# Patient Record
Sex: FEMALE | Race: BLACK OR AFRICAN AMERICAN | Employment: FULL TIME | URBAN - METROPOLITAN AREA
[De-identification: names, ages, dates, MRNs, and addresses within clinical notes are randomized per-mention and may not be internally consistent; named-entity substitution may affect disease eponyms.]

---

## 2017-03-21 ENCOUNTER — OFFICE VISIT (OUTPATIENT)
Dept: INTERNAL MEDICINE CLINIC | Age: 52
End: 2017-03-21

## 2017-03-21 VITALS
HEART RATE: 72 BPM | HEIGHT: 65 IN | OXYGEN SATURATION: 98 % | TEMPERATURE: 97.9 F | BODY MASS INDEX: 22.49 KG/M2 | SYSTOLIC BLOOD PRESSURE: 123 MMHG | DIASTOLIC BLOOD PRESSURE: 77 MMHG | RESPIRATION RATE: 21 BRPM | WEIGHT: 135 LBS

## 2017-03-21 DIAGNOSIS — M79.7 FIBROMYALGIA: ICD-10-CM

## 2017-03-21 DIAGNOSIS — Q61.5 MEDULLARY SPONGE KIDNEY: ICD-10-CM

## 2017-03-21 DIAGNOSIS — N20.0 NEPHROLITHIASIS: Primary | ICD-10-CM

## 2017-03-21 RX ORDER — ROSUVASTATIN CALCIUM 10 MG/1
10 TABLET, COATED ORAL
Qty: 30 TAB | Refills: 11 | Status: SHIPPED | OUTPATIENT
Start: 2017-03-21 | End: 2018-05-08 | Stop reason: SDUPTHER

## 2017-03-21 RX ORDER — ROSUVASTATIN CALCIUM 10 MG/1
10 TABLET, COATED ORAL
COMMUNITY
End: 2017-03-21 | Stop reason: SDUPTHER

## 2017-03-21 RX ORDER — DULOXETIN HYDROCHLORIDE 30 MG/1
30 CAPSULE, DELAYED RELEASE ORAL DAILY
Qty: 30 CAP | Refills: 5 | Status: SHIPPED | OUTPATIENT
Start: 2017-03-21 | End: 2018-05-08 | Stop reason: ALTCHOICE

## 2017-03-21 NOTE — MR AVS SNAPSHOT
Visit Information Date & Time Provider Department Dept. Phone Encounter #  
 3/21/2017  3:30 PM Zuleyka Hurley MD [facility]TS MED [facility] PRIMARY CARE - Antoni Hauser [phone] [id number] Follow-up Instructions Return in about 1 year (around 3/21/2018). Follow-up and Disposition History Upcoming Health Maintenance Date Due FOBT Q 1 YEAR AGE 50-75 8/5/2016 BREAST CANCER SCRN MAMMOGRAM 1/7/2018 PAP AKA CERVICAL CYTOLOGY 5/27/2018 DTaP/Tdap/Td series (2 - Td) 5/17/2026 Allergies as of 3/21/2017  Review Complete On: 3/21/2017 By: Zuleyka Hurley MD  
  
 Severity Noted Reaction Type Reactions Penicillins  12/29/2014   Side Effect Hives Current Immunizations  Never Reviewed No immunizations on file. Not reviewed this visit You Were Diagnosed With   
  
 Codes Comments Nephrolithiasis    -  Primary ICD-10-CM: N20.0 ICD-9-CM: 592.0 Medullary sponge kidney     ICD-10-CM: Q61.5 ICD-9-CM: 753.17 Fibromyalgia     ICD-10-CM: M79.7 ICD-9-CM: 729.1 Vitals BP Pulse Temp Resp Height(growth percentile) Weight(growth percentile) 123/77 (BP 1 Location: Right arm, BP Patient Position: Sitting) 72 97.9 °F (36.6 °C) (Oral) 21 5' 5\" (1.651 m) 135 lb (61.2 kg) SpO2 BMI OB Status Smoking Status 98% 22.47 kg/m2 Postmenopausal Never Smoker BMI and BSA Data Body Mass Index Body Surface Area  
 22.47 kg/m 2 1.68 m 2 Preferred Pharmacy Pharmacy Name Phone [facility] [address], [facility] [phone] Your Updated Medication List  
  
   
This list is accurate as of: 3/21/17  5:14 PM.  Always use your most recent med list.  
  
  
  
  
 DULoxetine 30 mg capsule Commonly known as:  CYMBALTA Take 1 Cap by mouth daily. OTHER Rx: 1 pair of compression stockings, medium tightness (need to check) Dx: orthostatic hypotension (458.0) rosuvastatin 10 mg tablet Commonly known as:  CRESTOR Take 1 Tab by mouth nightly. Prescriptions Sent to Pharmacy Refills  
 rosuvastatin (CRESTOR) 10 mg tablet 11 Sig: Take 1 Tab by mouth nightly. Class: Normal  
 Pharmacy: [address] Ph #: [phone] Route: Oral  
 DULoxetine (CYMBALTA) 30 mg capsule 5 Sig: Take 1 Cap by mouth daily. Class: Normal  
 Pharmacy: [address] Ph #: [phone] Route: Oral  
  
We Performed the Following CBC WITH AUTOMATED DIFF [83574 CPT(R)] CULTURE, URINE [id number] CPT(R)] HEMOGLOBIN A1C WITH EAG [60291 CPT(R)] LIPID PANEL [99189 CPT(R)] METABOLIC PANEL, COMPREHENSIVE [84271 CPT(R)] AR COLLECTION VENOUS BLOOD,VENIPUNCTURE [id number] CPT(R)] TSH 3RD GENERATION [59464 CPT(R)] URIC ACID [id number] CPT(R)] URINALYSIS W/ RFLX MICROSCOPIC [67425 CPT(R)] Follow-up Instructions Return in about 1 year (around 3/21/2018). Introducing [facility] & HEALTH SERVICES! Dear Jatinder Moon: 
Thank you for requesting a [employer] account. Our records indicate that you already have an active [employer] account. You can access your account anytime at [email or web address] Did you know that you can access your hospital and ER discharge instructions at any time in [employer]? You can also review all of your test results from your hospital stay or ER visit. Additional Information If you have questions, please visit the Frequently Asked Questions section of the [employer] website at [email or web address] Remember, [employer] is NOT to be used for urgent needs. For medical emergencies, dial 911. Now available from your iPhone and Android! Please provide this summary of care documentation to your next provider. Your primary care clinician is listed as Thad Darnell. If you have any questions after today's visit, please call [phone].

## 2017-03-21 NOTE — PROGRESS NOTES
SPORTS MEDICINE AND PRIMARY CARE  Dyan Brooks MD, [address] [address]  Phone:  [phone]  Fax: [phone]      Chief Complaint   Patient presents with    Physical         SUBECTIVE:    Anibal Andersen is a 46 y.o. female Patient returns today ambulatory, alert and appropriate and has the capacity to give an accurate history. Since we last saw her she was seen by Ashvin Quiros MD on 10/23/16 for hematuria. Her blood count at that time was unremarkable as well as a CMP. A CT of the abdomen and pelvis revealed bilateral nephrolithiasis. [facility] recurrent [facility] disease related to poor hydration and increased salt intake. Patient has a history of dyslipidemia, cigarette abuse, and fibromyalgia and is seen for evaluation. Current Outpatient Prescriptions   Medication Sig Dispense Refill    rosuvastatin (CRESTOR) 10 mg tablet Take 1 Tab by mouth nightly. 30 Tab 11    DULoxetine (CYMBALTA) 30 mg capsule Take 1 Cap by mouth daily. 30 Cap 5    OTHER Rx: 1 pair of compression stockings, medium tightness (need to check)  Dx: orthostatic hypotension (458.0) 1 Units 1     Past Medical History:   Diagnosis Date    BPV (benign positional vertigo)     Breast pain     Cigarette smoker     stopped 1-1-14    Dense breast     Dyslipidemia     Fatigue     Fibromyalgia     GERD (gastroesophageal reflux disease)     HA (headache)     Headache     Hypercholesterolemia     Left forearm pain     Medullary sponge kidney     Myalgia     Nephrolithiasis 10/22/2016    Normal cardiac stress test 12-10-15    STRESS ECHO    Prediabetes     UTI (lower urinary tract infection)      Past Surgical History:   Procedure Laterality Date    HX GYN      HX LITHOTRIPSY       Allergies   Allergen Reactions    Penicillins Hives       REVIEW OF SYSTEMS:   Pain is related to fibromyalgia.          Social History     Social History  Marital status: SINGLE     Spouse name: N/A    Number of children: N/A    Years of education: N/A     Social History Main Topics    Smoking status: Never Smoker    Smokeless tobacco: Never Used    Alcohol use No    Drug use: No    Sexual activity: Not Asked     Other Topics Concern    None     Social History Narrative   r  Family History   Problem Relation Age of Onset    Cancer Father        OBJECTIVE:  Visit Vitals    /77 (BP 1 Location: Right arm, BP Patient Position: Sitting)    Pulse 72    Temp 97.9 °F (36.6 °C) (Oral)    Resp 21    Ht 5' 5\" (1.651 m)    Wt 135 lb (61.2 kg)    SpO2 98%    BMI 22.47 kg/m2     ENT: perrla,  eom intact  NECK: supple. Thyroid normal  CHEST: clear to ascultation and percussion   HEART: regular rate and rhythm  ABD: soft, bowel sounds active  EXTREMITIES: no edema, pulse 1+     No visits with results within 3 Month(s) from this visit. Latest known visit with results is:    Admission on 10/22/2016, Discharged on 10/22/2016   Component Date Value Ref Range Status    WBC 10/22/2016 6.8  3.6 - 11.0 K/uL Final    RBC 10/22/2016 4.20  3.80 - 5.20 M/uL Final    HGB 10/22/2016 12.4  11.5 - 16.0 g/dL Final    HCT 10/22/2016 37.5  35.0 - 47.0 % Final    MCV 10/22/2016 89.3  80.0 - 99.0 FL Final    MCH 10/22/2016 29.5  26.0 - 34.0 PG Final    MCHC 10/22/2016 33.1  30.0 - 36.5 g/dL Final    RDW 10/22/2016 13.1  11.5 - 14.5 % Final    PLATELET 13/94/3714 927  150 - 400 K/uL Final    NEUTROPHILS 10/22/2016 54  32 - 75 % Final    LYMPHOCYTES 10/22/2016 37  12 - 49 % Final    MONOCYTES 10/22/2016 7  5 - 13 % Final    EOSINOPHILS 10/22/2016 2  0 - 7 % Final    BASOPHILS 10/22/2016 0  0 - 1 % Final    ABS. NEUTROPHILS 10/22/2016 3.7  1.8 - 8.0 K/UL Final    ABS. LYMPHOCYTES 10/22/2016 2.6  0.8 - 3.5 K/UL Final    ABS. MONOCYTES 10/22/2016 0.5  0.0 - 1.0 K/UL Final    ABS. EOSINOPHILS 10/22/2016 0.1  0.0 - 0.4 K/UL Final    ABS.  BASOPHILS 10/22/2016 0.0 0.0 - 0.1 K/UL Final    Sodium 10/22/2016 140  136 - 145 mmol/L Final    Potassium 10/22/2016 3.6  3.5 - 5.1 mmol/L Final    Chloride 10/22/2016 104  97 - 108 mmol/L Final    CO2 10/22/2016 30  21 - 32 mmol/L Final    Anion gap 10/22/2016 6  5 - 15 mmol/L Final    Glucose 10/22/2016 77  65 - 100 mg/dL Final    BUN 10/22/2016 14  6 - 20 MG/DL Final    Creatinine 10/22/2016 0.70  0.55 - 1.02 MG/DL Final    BUN/Creatinine ratio 10/22/2016 20  12 - 20   Final    GFR est AA 10/22/2016 >60  >60 ml/min/1.73m2 Final    GFR est non-AA 10/22/2016 >60  >60 ml/min/1.73m2 Final    Comment: Estimated GFR is calculated using the IDMS-traceable Modification of Diet in Renal Disease (MDRD) Study equation, reported for both  Americans (GFRAA) and non- Americans (GFRNA), and normalized to 1.73m2 body surface area. The physician must decide which value applies to the patient. The MDRD study equation should only be used in individuals age 25 or older. It has not been validated for the following: pregnant women, patients with serious comorbid conditions, or on certain medications, or persons with extremes of body size, muscle mass, or nutritional status.  Calcium 10/22/2016 9.3  8.5 - 10.1 MG/DL Final    Bilirubin, total 10/22/2016 0.3  0.2 - 1.0 MG/DL Final    ALT (SGPT) 10/22/2016 19  12 - 78 U/L Final    AST (SGOT) 10/22/2016 17  15 - 37 U/L Final    Alk.  phosphatase 10/22/2016 115  45 - 117 U/L Final    Protein, total 10/22/2016 8.0  6.4 - 8.2 g/dL Final    Albumin 10/22/2016 4.0  3.5 - 5.0 g/dL Final    Globulin 10/22/2016 4.0  2.0 - 4.0 g/dL Final    A-G Ratio 10/22/2016 1.0* 1.1 - 2.2   Final    Color 10/22/2016 RED    Final    Comment: Macroscopic performed on spun urine due to gross blood  or mucus  Color Reference Range: Straw, Yellow or Dark Yellow      Appearance 10/22/2016 BLOODY* CLEAR   Final    Specific gravity 10/22/2016 1.015  1.003 - 1.030   Final    pH (UA) 10/22/2016 7.5 5.0 - 8.0   Final    Protein 10/22/2016 30* NEG mg/dL Final    Glucose 10/22/2016 NEGATIVE   NEG mg/dL Final    Reflex testing of urine samples from children less than two years of age for reducing substances has been discontinued. If the clinical setting warrants, urine reducing substance testing is available upon specific physician order.  Ketone 10/22/2016 NEGATIVE   NEG mg/dL Final    Bilirubin 10/22/2016 NEGATIVE   NEG   Final    Blood 10/22/2016 LARGE* NEG   Final    Urobilinogen 10/22/2016 0.2  0.2 - 1.0 EU/dL Final    Nitrites 10/22/2016 NEGATIVE   NEG   Final    Leukocyte Esterase 10/22/2016 NEGATIVE   NEG   Final    WBC 10/22/2016 0-4  0 - 4 /hpf Final    RBC 10/22/2016 >100* 0 - 5 /hpf Final    Epithelial cells 10/22/2016 MODERATE* FEW /lpf Final    Epithelial cell category consists of squamous cells and /or transitional urothelial cells. Renal tubular cells, if present, are separately identified as such.  Bacteria 10/22/2016 NEGATIVE   NEG /hpf Final    UA:UC IF INDICATED 10/22/2016 CULTURE NOT INDICATED BY UA RESULT  CNI   Final    Lipase 10/22/2016 142  73 - 393 U/L Final          ASSESSMENT:  1. Nephrolithiasis    2. Medullary sponge kidney    3. Fibromyalgia      Neurontin was not effective for the fibromyalgia. She received Percocet for the stone disease and found that very effective. We suggest we will not prescribe narcotics for fibromyalgia and we give her the explanation as to why. Since the Gabapentin was not effective we will give her a trial of Cymbalta, titrate the dose until she is more comfortable. She is out of her Crestor for which we refill today. Blood pressure control is at goal.      Her BMI is improved down to 22.47. We encourage physical activity for 30 minutes five days a week. She will return to see us in six months to a year.           PLAN:  .  Orders Placed This Encounter    CULTURE, URINE    LIPID PANEL    TSH 3RD GENERATION    URIC ACID  CBC WITH AUTOMATED DIFF    METABOLIC PANEL, COMPREHENSIVE    HEMOGLOBIN A1C WITH EAG    URINALYSIS W/ RFLX MICROSCOPIC    DISCONTD: rosuvastatin (CRESTOR) 10 mg tablet    rosuvastatin (CRESTOR) 10 mg tablet    DULoxetine (CYMBALTA) 30 mg capsule       Follow-up Disposition:  Return in about 1 year (around 3/21/2018). ATTENTION:   This medical record was transcribed using an electronic medical records system. Although proofread, it may and can contain electronic and spelling errors. Other human spelling and other errors may be present. Corrections may be executed at a later time. Please feel free to contact us for any clarifications as needed.

## 2017-03-21 NOTE — PROGRESS NOTES
.1. Have you been to the ER, urgent care clinic since your last visit? Hospitalized since your last visit? No    2. Have you seen or consulted any other health care providers outside of the [address] [place] [address] since your last visit? Include any pap smears or colon screening.  No

## 2017-03-22 LAB
ALBUMIN SERPL-MCNC: 4.2 G/DL (ref 3.5–5.5)
ALBUMIN/GLOB SERPL: 1.6 {RATIO} (ref 1.2–2.2)
ALP SERPL-CCNC: 109 IU/L (ref 39–117)
ALT SERPL-CCNC: 10 IU/L (ref 0–32)
APPEARANCE UR: CLEAR
AST SERPL-CCNC: 15 IU/L (ref 0–40)
BACTERIA #/AREA URNS HPF: ABNORMAL /[HPF]
BASOPHILS # BLD AUTO: 0 X10E3/UL (ref 0–0.2)
BASOPHILS NFR BLD AUTO: 0 %
BILIRUB SERPL-MCNC: 0.2 MG/DL (ref 0–1.2)
BILIRUB UR QL STRIP: NEGATIVE
BUN SERPL-MCNC: 15 MG/DL (ref 6–24)
BUN/CREAT SERPL: 26 (ref 9–23)
CALCIUM SERPL-MCNC: 9.4 MG/DL (ref 8.7–10.2)
CASTS URNS QL MICRO: ABNORMAL /LPF
CHLORIDE SERPL-SCNC: 99 MMOL/L (ref 96–106)
CHOLEST SERPL-MCNC: 246 MG/DL (ref 100–199)
CO2 SERPL-SCNC: 25 MMOL/L (ref 18–29)
COLOR UR: YELLOW
CREAT SERPL-MCNC: 0.58 MG/DL (ref 0.57–1)
EOSINOPHIL # BLD AUTO: 0.1 X10E3/UL (ref 0–0.4)
EOSINOPHIL NFR BLD AUTO: 2 %
EPI CELLS #/AREA URNS HPF: ABNORMAL /HPF
ERYTHROCYTE [DISTWIDTH] IN BLOOD BY AUTOMATED COUNT: 13.8 % (ref 12.3–15.4)
EST. AVERAGE GLUCOSE BLD GHB EST-MCNC: 114 MG/DL
GLOBULIN SER CALC-MCNC: 2.6 G/DL (ref 1.5–4.5)
GLUCOSE SERPL-MCNC: 82 MG/DL (ref 65–99)
GLUCOSE UR QL: NEGATIVE
HBA1C MFR BLD: 5.6 % (ref 4.8–5.6)
HCT VFR BLD AUTO: 35.1 % (ref 34–46.6)
HDLC SERPL-MCNC: 75 MG/DL
HGB BLD-MCNC: 11.3 G/DL (ref 11.1–15.9)
HGB UR QL STRIP: ABNORMAL
IMM GRANULOCYTES # BLD: 0 X10E3/UL (ref 0–0.1)
IMM GRANULOCYTES NFR BLD: 0 %
KETONES UR QL STRIP: NEGATIVE
LDLC SERPL CALC-MCNC: 155 MG/DL (ref 0–99)
LEUKOCYTE ESTERASE UR QL STRIP: ABNORMAL
LYMPHOCYTES # BLD AUTO: 3.1 X10E3/UL (ref 0.7–3.1)
LYMPHOCYTES NFR BLD AUTO: 41 %
MCH RBC QN AUTO: 29.3 PG (ref 26.6–33)
MCHC RBC AUTO-ENTMCNC: 32.2 G/DL (ref 31.5–35.7)
MCV RBC AUTO: 91 FL (ref 79–97)
MICRO URNS: ABNORMAL
MONOCYTES # BLD AUTO: 0.6 X10E3/UL (ref 0.1–0.9)
MONOCYTES NFR BLD AUTO: 8 %
MUCOUS THREADS URNS QL MICRO: PRESENT
NEUTROPHILS # BLD AUTO: 3.7 X10E3/UL (ref 1.4–7)
NEUTROPHILS NFR BLD AUTO: 49 %
NITRITE UR QL STRIP: NEGATIVE
PH UR STRIP: 6.5 [PH] (ref 5–7.5)
PLATELET # BLD AUTO: 226 X10E3/UL (ref 150–379)
POTASSIUM SERPL-SCNC: 4 MMOL/L (ref 3.5–5.2)
PROT SERPL-MCNC: 6.8 G/DL (ref 6–8.5)
PROT UR QL STRIP: ABNORMAL
RBC # BLD AUTO: 3.86 X10E6/UL (ref 3.77–5.28)
RBC #/AREA URNS HPF: ABNORMAL /HPF
SODIUM SERPL-SCNC: 140 MMOL/L (ref 134–144)
SP GR UR: 1.02 (ref 1–1.03)
TRIGL SERPL-MCNC: 79 MG/DL (ref 0–149)
TSH SERPL DL<=0.005 MIU/L-ACNC: 1.28 UIU/ML (ref 0.45–4.5)
URATE SERPL-MCNC: 2.9 MG/DL (ref 2.5–7.1)
UROBILINOGEN UR STRIP-MCNC: 1 MG/DL (ref 0.2–1)
VLDLC SERPL CALC-MCNC: 16 MG/DL (ref 5–40)
WBC # BLD AUTO: 7.5 X10E3/UL (ref 3.4–10.8)
WBC #/AREA URNS HPF: ABNORMAL /HPF

## 2017-03-22 RX ORDER — NITROFURANTOIN 25; 75 MG/1; MG/1
100 CAPSULE ORAL 2 TIMES DAILY
Qty: 10 CAP | Refills: 0 | Status: SHIPPED | OUTPATIENT
Start: 2017-03-22 | End: 2017-03-27

## 2017-03-23 LAB — BACTERIA UR CULT: NORMAL

## 2017-03-29 ENCOUNTER — HOSPITAL ENCOUNTER (OUTPATIENT)
Dept: MAMMOGRAPHY | Age: 52
Discharge: HOME | End: 2017-03-29
Attending: INTERNAL MEDICINE
Payer: COMMERCIAL

## 2017-03-29 DIAGNOSIS — Z12.31 VISIT FOR SCREENING MAMMOGRAM: ICD-10-CM

## 2017-03-29 PROCEDURE — 77067 SCR MAMMO BI INCL CAD: CPT

## 2017-06-13 ENCOUNTER — OFFICE VISIT (OUTPATIENT)
Dept: INTERNAL MEDICINE CLINIC | Age: 52
End: 2017-06-13

## 2017-06-13 VITALS
HEIGHT: 65 IN | RESPIRATION RATE: 18 BRPM | HEART RATE: 73 BPM | WEIGHT: 134.8 LBS | BODY MASS INDEX: 22.46 KG/M2 | OXYGEN SATURATION: 100 % | SYSTOLIC BLOOD PRESSURE: 113 MMHG | TEMPERATURE: 98.8 F | DIASTOLIC BLOOD PRESSURE: 63 MMHG

## 2017-06-13 DIAGNOSIS — R73.03 PREDIABETES: ICD-10-CM

## 2017-06-13 DIAGNOSIS — M79.7 FIBROMYALGIA: ICD-10-CM

## 2017-06-13 DIAGNOSIS — M54.16 LUMBAR RADICULOPATHY, ACUTE: Primary | ICD-10-CM

## 2017-06-13 DIAGNOSIS — E78.5 DYSLIPIDEMIA: ICD-10-CM

## 2017-06-13 RX ORDER — PREDNISONE 20 MG/1
60 TABLET ORAL
Qty: 21 TAB | Refills: 0 | Status: SHIPPED | OUTPATIENT
Start: 2017-06-13 | End: 2018-05-08 | Stop reason: ALTCHOICE

## 2017-06-13 NOTE — PROGRESS NOTES
1. Have you been to the ER, urgent care clinic since your last visit? Hospitalized since your last visit? Yes Where: [facility]    2. Have you seen or consulted any other health care providers outside of the [address] [place] [address] since your last visit? Include any pap smears or colon screening.  Yes Reason for visit: right leg pain

## 2017-06-13 NOTE — MR AVS SNAPSHOT
Visit Information Date & Time Provider Department Dept. Phone Encounter #  
 6/13/2017  4:15 PM MD [facility] - Bautista Robert [phone] [id number] Follow-up Instructions Return in about 4 weeks (around 7/11/2017). Follow-up and Disposition History Your Appointments 7/13/2017  4:00 PM  
Any with MD [facility] - Bautista Robert ([address]) Appt Note: 1 month f/u  
 [address] [place] [address] [place] [address]  
  
   
 [address] [facility] [address] Upcoming Health Maintenance Date Due FOBT Q 1 YEAR AGE 50-75 8/5/2016 INFLUENZA AGE 9 TO ADULT 8/1/2017 PAP AKA CERVICAL CYTOLOGY 5/27/2018 BREAST CANCER SCRN MAMMOGRAM 3/29/2019 DTaP/Tdap/Td series (2 - Td) 5/17/2026 Allergies as of 6/13/2017  Review Complete On: 6/13/2017 By: Lucho Coates MD  
  
 Severity Noted Reaction Type Reactions Penicillins  12/29/2014   Side Effect Hives Current Immunizations  Never Reviewed No immunizations on file. Not reviewed this visit You Were Diagnosed With   
  
 Codes Comments Lumbar radiculopathy, acute    -  Primary ICD-10-CM: M54.16 
ICD-9-CM: 724.4 Fibromyalgia     ICD-10-CM: M79.7 ICD-9-CM: 729.1 Dyslipidemia     ICD-10-CM: E78.5 ICD-9-CM: 272.4 Prediabetes     ICD-10-CM: R73.03 
ICD-9-CM: 790.29 Vitals BP Pulse Temp Resp Height(growth percentile) Weight(growth percentile) 113/63 (BP 1 Location: Right arm, BP Patient Position: Sitting) 73 98.8 °F (37.1 °C) (Oral) 18 5' 5\" (1.651 m) 134 lb 12.8 oz (61.1 kg) SpO2 BMI OB Status Smoking Status 100% 22.43 kg/m2 Postmenopausal Never Smoker BMI and BSA Data Body Mass Index Body Surface Area  
 22.43 kg/m 2 1.67 m 2 Preferred Pharmacy Pharmacy Name Phone [facility] [address] [phone] Your Updated Medication List  
  
   
This list is accurate as of: 6/13/17  5:36 PM.  Always use your most recent med list.  
  
  
  
  
 DULoxetine 30 mg capsule Commonly known as:  CYMBALTA Take 1 Cap by mouth daily. OTHER Rx: 1 pair of compression stockings, medium tightness (need to check) Dx: orthostatic hypotension (458.0)  
  
 predniSONE 20 mg tablet Commonly known as:  Letty Bray Take 3 Tabs by mouth daily (with breakfast). rosuvastatin 10 mg tablet Commonly known as:  CRESTOR Take 1 Tab by mouth nightly. Prescriptions Sent to Pharmacy Refills  
 predniSONE (DELTASONE) 20 mg tablet 0 Sig: Take 3 Tabs by mouth daily (with breakfast). Class: Normal  
 Pharmacy: [facility] [address] #: [phone] Route: Oral  
  
We Performed the Following OCCULT BLOOD, IMMUNOASSAY (FIT) [id number] CPT(R)] REFERRAL TO PHYSICAL THERAPY [CAL18 Custom] Follow-up Instructions Return in about 4 weeks (around 7/11/2017). To-Do List   
 06/13/2017 Neurology:  EMG TWO EXTREMITIES LOWER   
  
 06/13/2017 Imaging:  XR SPINE LUMB 2 OR 3 V Referral Information Referral ID Referred By Referred To  
  
 [id number] Amber BRIONES Not Available Visits Status Start Date End Date 1 New Request 6/13/17 6/13/18 If your referral has a status of pending review or denied, additional information will be sent to support the outcome of this decision. Introducing [facility] & [facility]! Dear Marli Fernandez: 
Thank you for requesting a [employer] account. Our records indicate that you already have an active [employer] account. You can access your account anytime at [email or web address] Did you know that you can access your hospital and ER discharge instructions at any time in [employer]? You can also review all of your test results from your hospital stay or ER visit. Additional Information If you have questions, please visit the Frequently Asked Questions section of the [employer] website at [email or web address] Remember, [employer] is NOT to be used for urgent needs. For medical emergencies, dial 911. Now available from your iPhone and Android! Please provide this summary of care documentation to your next provider. Your primary care clinician is listed as Francy Machado. If you have any questions after today's visit, please call [phone].

## 2017-06-13 NOTE — PROGRESS NOTES
SPORTS MEDICINE AND PRIMARY CARE  Liliana Gonsalez MD, [address] [address]  Phone:  [phone]  Fax: [phone]       Chief Complaint   Patient presents with    Leg Pain   . SUBJECTIVE:    Bruce Elizabeth is a 45 y.o. female Patient returns today ambulatory, alert and appropriate and has the capacity to give an accurate history. She has a known history of fibromyalgia, GERD, dyslipidemia,nephrolithiasis and prediabetes. Since we last saw her she has been having right leg discomfort. It starts in the hip area and radiates all the way down the leg and into the calf. She was seen on [facility] at [facility] where they did a doppler exam which was negative. They gave her something for pain in the emergency room and she was released. Patient comes in today still having pain in the right leg. She missed two days of work because of the severity of the pain. Walking seems to aggravate the discomfort. Patient denies other new complaints. There is no history of trauma. Current Outpatient Prescriptions   Medication Sig Dispense Refill    predniSONE (DELTASONE) 20 mg tablet Take 3 Tabs by mouth daily (with breakfast). 21 Tab 0    rosuvastatin (CRESTOR) 10 mg tablet Take 1 Tab by mouth nightly. 30 Tab 11    DULoxetine (CYMBALTA) 30 mg capsule Take 1 Cap by mouth daily.  30 Cap 5    OTHER Rx: 1 pair of compression stockings, medium tightness (need to check)  Dx: orthostatic hypotension (458.0) 1 Units 1     Past Medical History:   Diagnosis Date    BPV (benign positional vertigo)     Breast pain     Cigarette smoker     stopped 1-1-14    Dense breast     Dyslipidemia     Fatigue     Fibromyalgia     GERD (gastroesophageal reflux disease)     HA (headache)     Headache     Hypercholesterolemia     Left forearm pain     Lumbar radiculopathy, acute 06/13/2017    Medullary sponge kidney     Myalgia     Nephrolithiasis 10/22/2016    Normal cardiac stress test 12-10-15    STRESS ECHO    Prediabetes     UTI (lower urinary tract infection)      Past Surgical History:   Procedure Laterality Date    HX GYN      HX LITHOTRIPSY       Allergies   Allergen Reactions    Penicillins Hives         REVIEW OF SYSTEMS:  General: negative for - chills or fever  ENT: negative for - headaches, nasal congestion or tinnitus  Respiratory: negative for - cough, hemoptysis, shortness of breath or wheezing  Cardiovascular : negative for - chest pain, edema, palpitations or shortness of breath  Gastrointestinal: negative for - abdominal pain, blood in stools, heartburn or nausea/vomiting  Genito-Urinary: no dysuria, trouble voiding, or hematuria  Musculoskeletal: negative for - gait disturbance, joint pain, joint stiffness or joint swelling  Neurological: no TIA or stroke symptoms  Hematologic: no bruises, no bleeding, no swollen glands  Integument: no lumps, mole changes, nail changes or rash  Endocrine: no malaise/lethargy or unexpected weight changes      Social History     Social History    Marital status: SINGLE     Spouse name: N/A    Number of children: N/A    Years of education: N/A     Social History Main Topics    Smoking status: Never Smoker    Smokeless tobacco: Never Used    Alcohol use No    Drug use: No    Sexual activity: Not Asked     Other Topics Concern    None     Social History Narrative     Family History   Problem Relation Age of Onset    Cancer Father        OBJECTIVE:    Visit Vitals    /63 (BP 1 Location: Right arm, BP Patient Position: Sitting)    Pulse 73    Temp 98.8 °F (37.1 °C) (Oral)    Resp 18    Ht 5' 5\" (1.651 m)    Wt 134 lb 12.8 oz (61.1 kg)    SpO2 100%    BMI 22.43 kg/m2     CONSTITUTIONAL: well , well nourished, appears age appropriate  EYES: perrla, eom intact  ENMT:moist mucous membranes, pharynx clear  NECK: supple.  Thyroid normal  RESPIRATORY: Chest: clear bilaterally   CARDIOVASCULAR: Heart: regular rate and rhythm  GASTROINTESTINAL: Abdomen: soft, bowel sounds active  HEMATOLOGIC: no pathological lymph nodes palpated  MUSCULOSKELETAL: Extremities: no edema, pulse 1+   INTEGUMENT: No unusual rashes or suspicious skin lesions noted. Nails appear normal.  NEUROLOGIC: non-focal exam   MENTAL STATUS: alert and oriented, appropriate affect           ASSESSMENT:  1. Lumbar radiculopathy, acute    2. Fibromyalgia    3. Dyslipidemia    4. Prediabetes      Straight leg raise is not particularly positive but her sister suggested she has a lumbar radiculopathy. We will give her a seven day course of steroids to see if that alleviates the discomfort. We will also ask for an x-ray of the lumbar spine and EMG and nerve conduction studies for the lower extremities. If the EMG and nerve conduction studies are negative then further evaluation probably would not be beneficial.  To try to relieve some of the discomfort in addition we will have her see physical therapy. Blood pressure control is excellent. Her BMI remains in the ideal body weight range. She will return to see us then in about two or three weeks after the studies are completed. PLAN:  .  Orders Placed This Encounter    XR SPINE LUMB 2 OR 3 V    OCCULT BLOOD, IMMUNOASSAY (FIT)    REFERRAL TO PHYSICAL THERAPY    predniSONE (DELTASONE) 20 mg tablet       Follow-up Disposition:  Return in about 4 weeks (around 7/11/2017). ATTENTION:   This medical record was transcribed using an electronic medical records system. Although proofread, it may and can contain electronic and spelling errors. Other human spelling and other errors may be present. Corrections may be executed at a later time. Please feel free to contact us for any clarifications as needed.

## 2017-11-27 RX ORDER — CIPROFLOXACIN 500 MG/1
500 TABLET ORAL 2 TIMES DAILY
Qty: 14 TAB | Refills: 0 | OUTPATIENT
Start: 2017-11-27 | End: 2017-12-04

## 2017-11-27 RX ORDER — NITROFURANTOIN 25; 75 MG/1; MG/1
100 CAPSULE ORAL 2 TIMES DAILY
Qty: 10 CAP | Refills: 0 | Status: SHIPPED | OUTPATIENT
Start: 2017-11-27 | End: 2017-12-02

## 2018-05-08 ENCOUNTER — OFFICE VISIT (OUTPATIENT)
Dept: INTERNAL MEDICINE CLINIC | Age: 53
End: 2018-05-08

## 2018-05-08 VITALS
HEIGHT: 65 IN | DIASTOLIC BLOOD PRESSURE: 87 MMHG | RESPIRATION RATE: 16 BRPM | WEIGHT: 132.2 LBS | TEMPERATURE: 97.8 F | HEART RATE: 65 BPM | OXYGEN SATURATION: 99 % | SYSTOLIC BLOOD PRESSURE: 135 MMHG | BODY MASS INDEX: 22.02 KG/M2

## 2018-05-08 DIAGNOSIS — M79.7 FIBROMYALGIA: ICD-10-CM

## 2018-05-08 DIAGNOSIS — Q61.5 MEDULLARY SPONGE KIDNEY: Primary | ICD-10-CM

## 2018-05-08 DIAGNOSIS — E78.5 DYSLIPIDEMIA: ICD-10-CM

## 2018-05-08 DIAGNOSIS — Z12.11 SCREEN FOR COLON CANCER: ICD-10-CM

## 2018-05-08 DIAGNOSIS — N20.0 NEPHROLITHIASIS: ICD-10-CM

## 2018-05-08 RX ORDER — ROSUVASTATIN CALCIUM 10 MG/1
10 TABLET, COATED ORAL
Qty: 30 TAB | Refills: 11 | Status: SHIPPED | OUTPATIENT
Start: 2018-05-08 | End: 2019-02-05 | Stop reason: SDUPTHER

## 2018-05-08 NOTE — PROGRESS NOTES
1. Have you been to the ER, urgent care clinic since your last visit? Hospitalized since your last visit? Yes When: 4-1-18 Where: [facility] Reason for visit: kidney stones    2. Have you seen or consulted any other health care providers outside of the [address] [place] [address] since your last visit? Include any pap smears or colon screening.  Yes When: 4-1-18 [facility] Reason for visit: kidney stones

## 2018-05-08 NOTE — MR AVS SNAPSHOT
[place] [address] 
[phone] Patient: Margareth Murillo MRN: [id number] Visit Information Date & Time Provider Department Dept. Phone Encounter #  
 2018  4:00 PM Jailene Hicks MD [facility] - [facility] [phone] [id number] Follow-up Instructions Return in about 3 months (around 2018). Follow-up and Disposition History Upcoming Health Maintenance Date Due FOBT Q 1 YEAR AGE 50-75 2016 PAP AKA CERVICAL CYTOLOGY 2018 Influenza Age 5 to Adult 2018 BREAST CANCER SCRN MAMMOGRAM 3/29/2019 DTaP/Tdap/Td series (2 - Td) 2026 Allergies as of 2018  Review Complete On: 2018 By: Jailene Hicks MD  
  
 Severity Noted Reaction Type Reactions Penicillins  2014   Side Effect Hives Current Immunizations  Never Reviewed No immunizations on file. Not reviewed this visit You Were Diagnosed With   
  
 Codes Comments Medullary sponge kidney    -  Primary ICD-10-CM: Q61.5 ICD-9-CM: 753.17 Nephrolithiasis     ICD-10-CM: N20.0 ICD-9-CM: 592.0 Dyslipidemia     ICD-10-CM: E78.5 ICD-9-CM: 272.4 Fibromyalgia     ICD-10-CM: M79.7 ICD-9-CM: 729.1 Screen for colon cancer     ICD-10-CM: Z12.11 ICD-9-CM: V76.51 Vitals BP Pulse Temp Resp Height(growth percentile) Weight(growth percentile) 135/87 65 97.8 °F (36.6 °C) (Oral) 16 5' 5\" (1.651 m) 132 lb 3.2 oz (60 kg) SpO2 BMI OB Status Smoking Status 99% 22 kg/m2 Postmenopausal Never Smoker BMI and BSA Data Body Mass Index Body Surface Area  
 22 kg/m 2 1.66 m 2 Preferred Pharmacy Pharmacy Name Phone [facility] [address] [place] [address], [address] [facility] [phone] Your Updated Medication List  
  
   
 This list is accurate as of 5/8/18  5:36 PM.  Always use your most recent med list.  
  
  
  
  
 rosuvastatin 10 mg tablet Commonly known as:  CRESTOR Take 1 Tab by mouth nightly. Prescriptions Sent to Pharmacy Refills  
 rosuvastatin (CRESTOR) 10 mg tablet 11 Sig: Take 1 Tab by mouth nightly. Class: Normal  
 Pharmacy: [facility]  [address] #: [phone] Route: Oral  
  
We Performed the Following CBC WITH AUTOMATED DIFF [38463 CPT(R)] COLLECTION VENOUS BLOOD,VENIPUNCTURE X1419842 CPT(R)] HEMOGLOBIN A1C WITH EAG [95840 CPT(R)] LIPID PANEL [29461 CPT(R)] METABOLIC PANEL, COMPREHENSIVE [38513 CPT(R)] REFERRAL TO GASTROENTEROLOGY [DXT73 Custom] Comments:  
 Please evaluate patient for colon. TSH 3RD GENERATION [47727 CPT(R)] Follow-up Instructions Return in about 3 months (around 8/8/2018). To-Do List   
 05/08/2018 Imaging:  JOÃO MAMMO BI SCREENING INCL CAD Referral Information Referral ID Referred By Referred To  
  
 [id number] Lesley Méndez MD   
   [address] [facility] [address] [place] [address], [address] [facility] Phone: [phone] Fax: [phone] Visits Status Start Date End Date 1 New Request 5/8/18 5/8/19 If your referral has a status of pending review or denied, additional information will be sent to support the outcome of this decision. Introducing [facility]! Dear Rebeka Hand: 
Thank you for requesting a [employer] account. Our records indicate that you already have an active [employer] account. You can access your account anytime at [email or web address] Did you know that you can access your hospital and ER discharge instructions at any time in [employer]? You can also review all of your test results from your hospital stay or ER visit. Additional Information If you have questions, please visit the Frequently Asked Questions section of the [employer] website at [email or web address] Remember, [employer] is NOT to be used for urgent needs. For medical emergencies, dial 911. Now available from your iPhone and Android! Please provide this summary of care documentation to your next provider. Your primary care clinician is listed as Elisa Hurst. If you have any questions after today's visit, please call [phone].

## 2018-05-08 NOTE — PROGRESS NOTES
SPOR[facility]ICINE [facility] PR[facility]ARE  Oskar Gutierres MD, [address] [address]  Phone:  [phone]  Fax: [phone]       Chief Complaint   Patient presents with    Cholesterol Problem   . SUBJECTIVE:    Kandice Craig is a 52 y.o. female Patient returns today with known history of medullary sponge kidney, nephrolithiasis, dense breasts, fibromyalgia, GERD, dyslipidemia, and is seen for evaluation. She saw urologist earlier at a visit to the ER. They recommended lithotripsy to crush the stone. This is being arranged currently by the urologist.  Other specific complaints denied and patient is seen for evaluation. Current Outpatient Prescriptions   Medication Sig Dispense Refill    rosuvastatin (CRESTOR) 10 mg tablet Take 1 Tab by mouth nightly.  27 Tab 11     Past Medical History:   Diagnosis Date    BPV (benign positional vertigo)     Breast pain     Cigarette smoker     stopped 1-1-14    Dense breast     Dyslipidemia     Fatigue     Fibromyalgia     GERD (gastroesophageal reflux disease)     HA (headache)     Headache     Hypercholesterolemia     Left forearm pain     Lumbar radiculopathy, acute 06/13/2017    Medullary sponge kidney     Myalgia     Nephrolithiasis 10/22/2016    Normal cardiac stress test 12-10-15    STRESS ECHO    Prediabetes     UTI (lower urinary tract infection)      Past Surgical History:   Procedure Laterality Date    HX GYN      HX LITHOTRIPSY       Allergies   Allergen Reactions    Penicillins Hives         REVIEW OF SYSTEMS:  General: negative for - chills or fever  ENT: negative for - headaches, nasal congestion or tinnitus  Respiratory: negative for - cough, hemoptysis, shortness of breath or wheezing  Cardiovascular : negative for - chest pain, edema, palpitations or shortness of breath  Gastrointestinal: negative for - abdominal pain, blood in stools, heartburn or nausea/vomiting  Genito-Urinary: no dysuria, trouble voiding, or hematuria  Musculoskeletal: negative for - gait disturbance, joint pain, joint stiffness or joint swelling  Neurological: no TIA or stroke symptoms  Hematologic: no bruises, no bleeding, no swollen glands  Integument: no lumps, mole changes, nail changes or rash  Endocrine: no malaise/lethargy or unexpected weight changes      Social History     Social History    Marital status: SINGLE     Spouse name: N/A    Number of children: N/A    Years of education: N/A     Social History Main Topics    Smoking status: Never Smoker    Smokeless tobacco: Never Used    Alcohol use No    Drug use: No    Sexual activity: Not Asked     Other Topics Concern    None     Social History Narrative     Family History   Problem Relation Age of Onset    Cancer Father        OBJECTIVE:    Visit Vitals    /87    Pulse 65    Temp 97.8 °F (36.6 °C) (Oral)    Resp 16    Ht 5' 5\" (1.651 m)    Wt 132 lb 3.2 oz (60 kg)    SpO2 99%    BMI 22 kg/m2     CONSTITUTIONAL: well , well nourished, appears age appropriate  EYES: perrla, eom intact  ENMT:moist mucous membranes, pharynx clear  NECK: supple. Thyroid normal  RESPIRATORY: Chest: clear bilaterally   CARDIOVASCULAR: Heart: regular rate and rhythm  GASTROINTESTINAL: Abdomen: soft, bowel sounds active  HEMATOLOGIC: no pathological lymph nodes palpated  MUSCULOSKELETAL: Extremities: no edema, pulse 1+   INTEGUMENT: No unusual rashes or suspicious skin lesions noted. Nails appear normal.  NEUROLOGIC: non-focal exam   MENTAL STATUS: alert and oriented, appropriate affect           ASSESSMENT:  1. Medullary sponge kidney    2. Nephrolithiasis    3. Dyslipidemia    4. Fibromyalgia    5. Screen for colon cancer      BP is a little higher than we'd like to see it and when we check it it gets to the 140s. She'll check it outside the office and if consistently greater than 130/80 we'll place her on a step 1 antihypertensive. She is agreeable to the plan.     She's not been taking her cholesterol medication. She didn't think insurance would pay for it. We sent it over to her pharmacist without any suggestion by the computer that it will not be paid for by the insurance company. Fibromyalgia is currently quiescent. She remains at her ideal body weight. She'll return to the office in about three months, primarily so we can look at her blood pressure. PLAN:  .  Orders Placed This Encounter    JOÃO MAMMO BI SCREENING INCL CAD    CBC WITH AUTOMATED DIFF    METABOLIC PANEL, COMPREHENSIVE    LIPID PANEL    TSH 3RD GENERATION    HEMOGLOBIN A1C WITH EAG    REFERRAL TO GASTROENTEROLOGY    rosuvastatin (CRESTOR) 10 mg tablet       Follow-up Disposition:  Return in about 3 months (around 8/8/2018). ATTENTION:   This medical record was transcribed using an electronic medical records system. Although proofread, it may and can contain electronic and spelling errors. Other human spelling and other errors may be present. Corrections may be executed at a later time. Please feel free to contact us for any clarifications as needed.

## 2018-05-09 LAB
ALBUMIN SERPL-MCNC: 4.3 G/DL (ref 3.5–5.5)
ALBUMIN/GLOB SERPL: 1.7 {RATIO} (ref 1.2–2.2)
ALP SERPL-CCNC: 124 IU/L (ref 39–117)
ALT SERPL-CCNC: 11 IU/L (ref 0–32)
AST SERPL-CCNC: 14 IU/L (ref 0–40)
BASOPHILS # BLD AUTO: 0 X10E3/UL (ref 0–0.2)
BASOPHILS NFR BLD AUTO: 0 %
BILIRUB SERPL-MCNC: <0.2 MG/DL (ref 0–1.2)
BUN SERPL-MCNC: 18 MG/DL (ref 6–24)
BUN/CREAT SERPL: 29 (ref 9–23)
CALCIUM SERPL-MCNC: 9.9 MG/DL (ref 8.7–10.2)
CHLORIDE SERPL-SCNC: 101 MMOL/L (ref 96–106)
CHOLEST SERPL-MCNC: 251 MG/DL (ref 100–199)
CO2 SERPL-SCNC: 29 MMOL/L (ref 18–29)
CREAT SERPL-MCNC: 0.63 MG/DL (ref 0.57–1)
EOSINOPHIL # BLD AUTO: 0.2 X10E3/UL (ref 0–0.4)
EOSINOPHIL NFR BLD AUTO: 2 %
ERYTHROCYTE [DISTWIDTH] IN BLOOD BY AUTOMATED COUNT: 14.1 % (ref 12.3–15.4)
EST. AVERAGE GLUCOSE BLD GHB EST-MCNC: 105 MG/DL
GFR SERPLBLD CREATININE-BSD FMLA CKD-EPI: 101 ML/MIN/1.73
GFR SERPLBLD CREATININE-BSD FMLA CKD-EPI: 117 ML/MIN/1.73
GLOBULIN SER CALC-MCNC: 2.6 G/DL (ref 1.5–4.5)
GLUCOSE SERPL-MCNC: 84 MG/DL (ref 65–99)
HBA1C MFR BLD: 5.3 % (ref 4.8–5.6)
HCT VFR BLD AUTO: 36.7 % (ref 34–46.6)
HDLC SERPL-MCNC: 73 MG/DL
HGB BLD-MCNC: 11.9 G/DL (ref 11.1–15.9)
IMM GRANULOCYTES # BLD: 0 X10E3/UL (ref 0–0.1)
IMM GRANULOCYTES NFR BLD: 0 %
LDLC SERPL CALC-MCNC: 159 MG/DL (ref 0–99)
LYMPHOCYTES # BLD AUTO: 3.2 X10E3/UL (ref 0.7–3.1)
LYMPHOCYTES NFR BLD AUTO: 41 %
MCH RBC QN AUTO: 28.7 PG (ref 26.6–33)
MCHC RBC AUTO-ENTMCNC: 32.4 G/DL (ref 31.5–35.7)
MCV RBC AUTO: 89 FL (ref 79–97)
MONOCYTES # BLD AUTO: 0.6 X10E3/UL (ref 0.1–0.9)
MONOCYTES NFR BLD AUTO: 8 %
NEUTROPHILS # BLD AUTO: 3.7 X10E3/UL (ref 1.4–7)
NEUTROPHILS NFR BLD AUTO: 49 %
PLATELET # BLD AUTO: 235 X10E3/UL (ref 150–379)
POTASSIUM SERPL-SCNC: 4.5 MMOL/L (ref 3.5–5.2)
PROT SERPL-MCNC: 6.9 G/DL (ref 6–8.5)
RBC # BLD AUTO: 4.14 X10E6/UL (ref 3.77–5.28)
SODIUM SERPL-SCNC: 141 MMOL/L (ref 134–144)
TRIGL SERPL-MCNC: 95 MG/DL (ref 0–149)
TSH SERPL DL<=0.005 MIU/L-ACNC: 2.42 UIU/ML (ref 0.45–4.5)
VLDLC SERPL CALC-MCNC: 19 MG/DL (ref 5–40)
WBC # BLD AUTO: 7.8 X10E3/UL (ref 3.4–10.8)

## 2018-06-20 ENCOUNTER — DOCUMENTATION ONLY (OUTPATIENT)
Dept: INTERNAL MEDICINE CLINIC | Age: 53
End: 2018-06-20

## 2018-06-20 NOTE — PROGRESS NOTES
Called patient in regard to scheduling an appointment for her to recieve her mammogram. I was unable to reach the patient and I was unable to leave a message due to the fact that she does not have a voice mail on her phone.  A letter has been mail out to the patient in regard to this referral.

## 2018-09-05 ENCOUNTER — TELEPHONE (OUTPATIENT)
Dept: INTERNAL MEDICINE CLINIC | Age: 53
End: 2018-09-05

## 2018-09-05 NOTE — TELEPHONE ENCOUNTER
PATIENT CALLED OFFICE STATING BURNING WHEN URINATE. PER DR. DICK WE CALL IN MACROBID 100MG 1 BID X 7 DAYS.

## 2019-01-17 ENCOUNTER — HOSPITAL ENCOUNTER (OUTPATIENT)
Dept: MAMMOGRAPHY | Age: 54
Discharge: HOME | End: 2019-01-17
Attending: INTERNAL MEDICINE
Payer: COMMERCIAL

## 2019-01-17 DIAGNOSIS — Z12.39 SCREENING BREAST EXAMINATION: ICD-10-CM

## 2019-01-17 PROCEDURE — 77067 SCR MAMMO BI INCL CAD: CPT

## 2019-02-05 ENCOUNTER — OFFICE VISIT (OUTPATIENT)
Dept: INTERNAL MEDICINE CLINIC | Age: 54
End: 2019-02-05

## 2019-02-05 VITALS
DIASTOLIC BLOOD PRESSURE: 82 MMHG | SYSTOLIC BLOOD PRESSURE: 133 MMHG | OXYGEN SATURATION: 99 % | RESPIRATION RATE: 18 BRPM | BODY MASS INDEX: 22.33 KG/M2 | WEIGHT: 134 LBS | HEIGHT: 65 IN | HEART RATE: 68 BPM | TEMPERATURE: 97.6 F

## 2019-02-05 DIAGNOSIS — R73.03 PREDIABETES: ICD-10-CM

## 2019-02-05 DIAGNOSIS — R92.2 DENSE BREAST: ICD-10-CM

## 2019-02-05 DIAGNOSIS — E78.5 DYSLIPIDEMIA: ICD-10-CM

## 2019-02-05 DIAGNOSIS — M79.7 FIBROMYALGIA: Primary | ICD-10-CM

## 2019-02-05 DIAGNOSIS — N20.0 NEPHROLITHIASIS: ICD-10-CM

## 2019-02-05 RX ORDER — METRONIDAZOLE 500 MG/1
500 TABLET ORAL 3 TIMES DAILY
Qty: 30 TAB | Refills: 0 | Status: SHIPPED | OUTPATIENT
Start: 2019-02-05 | End: 2019-03-21 | Stop reason: ALTCHOICE

## 2019-02-05 RX ORDER — SAME BUTANEDISULFONATE/BETAINE 400-600 MG
250 POWDER IN PACKET (EA) ORAL 2 TIMES DAILY
Qty: 14 CAP | Refills: 0 | Status: SHIPPED | OUTPATIENT
Start: 2019-02-05 | End: 2019-02-12

## 2019-02-05 RX ORDER — CEFUROXIME AXETIL 500 MG/1
500 TABLET ORAL 2 TIMES DAILY
Qty: 20 TAB | Refills: 0 | Status: SHIPPED | OUTPATIENT
Start: 2019-02-05 | End: 2019-02-15

## 2019-02-05 RX ORDER — ROSUVASTATIN CALCIUM 10 MG/1
10 TABLET, COATED ORAL
Qty: 30 TAB | Refills: 11 | Status: SHIPPED | OUTPATIENT
Start: 2019-02-05 | End: 2019-08-08 | Stop reason: SDUPTHER

## 2019-02-05 NOTE — PROGRESS NOTES
SPORTS MEDICINE AND PRIMARY [facility]ARE  Jade Ludwig MD, [address] [address]  Phone:  [phone]  Fax: [phone]       Chief Complaint   Patient presents with    Abdominal Pain     patient comlain of having abdominal pain, frequent urination and pain when urinating   . SUBJECTIVE:    Rosellen Hammans is a 52 y.o. female Patient returns today with known history of dyslipidemia, cigarette abuse, dense breasts, fibromyalgia, GERD, nephrolithiasis, prediabetes and is seen for evaluation. Since we last saw her she was seen by urology and not too long ago states she had a CT scan of her abdomen. We see a note from September, 2018, where she had been seen in May for abdominal and flank pain and CT at that time demonstrated bilateral renal calculi with right sided hydronephrosis secondary to right ureteral calculus. She underwent an ESWL of the right renal urethral calculus, but on 09/10 she still complained of abdominal pain, a KUB was done and revealed a 5 mm right renal stone and 4 mm left renal stone, essentially unchanged. Stone fragments seen on previous KUB, however, were no longer present. There was also some nausea. They recommended repeat CT, which we do not have the results of. Patient is seen for evaluation. Current Outpatient Medications   Medication Sig Dispense Refill    rosuvastatin (CRESTOR) 10 mg tablet Take 1 Tab by mouth nightly. 30 Tab 11    metroNIDAZOLE (FLAGYL) 500 mg tablet Take 1 Tab by mouth three (3) times daily. 30 Tab 0    cefUROXime (CEFTIN) 500 mg tablet Take 1 Tab by mouth two (2) times a day for 10 days. 20 Tab 0    Saccharomyces boulardii (FLORASTOR) 250 mg capsule Take 1 Cap by mouth two (2) times a day for 7 days.  14 Cap 0     Past Medical History:   Diagnosis Date    BPV (benign positional vertigo)     Breast pain     Cigarette smoker     stopped 1-1-14    Dense breast     Dyslipidemia     Fatigue     Fibromyalgia     GERD (gastroesophageal reflux disease)     HA (headache)     Headache     Hypercholesterolemia     Left forearm pain     Lumbar radiculopathy, acute 06/13/2017    Medullary sponge kidney     Myalgia     Nephrolithiasis 10/22/2016    Normal cardiac stress test 12-10-15    STRESS ECHO    Prediabetes     UTI (lower urinary tract infection)      Past Surgical History:   Procedure Laterality Date    HX GYN      HX LITHOTRIPSY       Allergies   Allergen Reactions    Penicillins Hives         REVIEW OF SYSTEMS:  General: negative for - chills or fever  ENT: negative for - headaches, nasal congestion or tinnitus  Respiratory: negative for - cough, hemoptysis, shortness of breath or wheezing  Cardiovascular : negative for - chest pain, edema, palpitations or shortness of breath  Gastrointestinal: negative for - abdominal pain, blood in stools, heartburn or nausea/vomiting  Genito-Urinary: no dysuria, trouble voiding, or hematuria  Musculoskeletal: negative for - gait disturbance, joint pain, joint stiffness or joint swelling  Neurological: no TIA or stroke symptoms  Hematologic: no bruises, no bleeding, no swollen glands  Integument: no lumps, mole changes, nail changes or rash  Endocrine: no malaise/lethargy or unexpected weight changes      Social History     Socioeconomic History    Marital status: SINGLE     Spouse name: Not on file    Number of children: Not on file    Years of education: Not on file    Highest education level: Not on file   Tobacco Use    Smoking status: Never Smoker    Smokeless tobacco: Never Used   Substance and Sexual Activity    Alcohol use: No     Alcohol/week: 0.0 oz    Drug use: No     Family History   Problem Relation Age of Onset    Cancer Father        OBJECTIVE:    Visit Vitals  /82 (BP 1 Location: Right arm, BP Patient Position: Sitting)   Pulse 68   Temp 97.6 °F (36.4 °C) (Oral)   Resp 18   Ht 5' 5\" (1.651 m)   Wt 134 lb (60.8 kg)   SpO2 99%   BMI 22.30 kg/m² CONSTITUTIONAL: well , well nourished, appears age appropriate  EYES: perrla, eom intact  ENMT:moist mucous membranes, pharynx clear  NECK: supple. Thyroid normal  RESPIRATORY: Chest: clear bilaterally   CARDIOVASCULAR: Heart: regular rate and rhythm  GASTROINTESTINAL: Abdomen: soft, bowel sounds active  HEMATOLOGIC: no pathological lymph nodes palpated  MUSCULOSKELETAL: Extremities: no edema, pulse 1+   INTEGUMENT: No unusual rashes or suspicious skin lesions noted. Nails appear normal.  NEUROLOGIC: non-focal exam   MENTAL STATUS: alert and oriented, appropriate affect           ASSESSMENT:  1. Fibromyalgia    2. Dense breast    3. Prediabetes    4. Dyslipidemia    5. Nephrolithiasis      The abdominal pain is not impressive for pelvic pain, but more impressive for left lower quadrant pain, perhaps left colon. She also has some epigastric discomfort and to a lesser extent some right lower quadrant pain. The question of diverticulitis is raised. Certainly that diagnosis cannot be excluded. She would prefer not to have another CT scan, which I do not disagree. She has never had a colonoscopy because of her reluctance, but now agreeable to having that completed. We will therefore refer her for colon exam.    Will check a UA and urine C&S. We will place her on Ceftin and Flagyl, which will take care of her UTI, but also for the remote possibility of diverticulitis flaring it would resolve that issue likewise. Patient is agreeable to the plan. Her blood pressure control is at goal.    She will be back to see us in about 3-4 weeks, sooner if it does not resolve. She will cancel the appointment if it resolves. Her blood pressure control is adequate. BMI is at ideal body weight. I have discussed the diagnosis with the patient and the intended plan as seen in the  orders above. The patient understands and agees with the plan.   The patient has   received an after visit summary and questions were answered concerning  future plans  Patient labs and/or xrays were reviewed  Past records were reviewed. PLAN:  .  Orders Placed This Encounter    CULTURE, URINE    URINALYSIS W/ RFLX MICROSCOPIC    REFERRAL TO GASTROENTEROLOGY    rosuvastatin (CRESTOR) 10 mg tablet    metroNIDAZOLE (FLAGYL) 500 mg tablet    cefUROXime (CEFTIN) 500 mg tablet    Saccharomyces boulardii (FLORASTOR) 250 mg capsule       Follow-up Disposition:  Return in about 4 weeks (around 3/5/2019). ATTENTION:   This medical record was transcribed using an electronic medical records system. Although proofread, it may and can contain electronic and spelling errors. Other human spelling and other errors may be present. Corrections may be executed at a later time. Please feel free to contact us for any clarifications as needed.

## 2019-02-05 NOTE — PROGRESS NOTES
1. Have you been to the ER, urgent care clinic since your last visit? Hospitalized since your last visit? No    2. Have you seen or consulted any other health care providers outside of the [address] [place] [address] since your last visit? Include any pap smears or colon screening.  No

## 2019-03-14 RX ORDER — ROSUVASTATIN CALCIUM 10 MG/1
10 TABLET, COATED ORAL
Qty: 30 TAB | Refills: 11 | Status: CANCELLED | OUTPATIENT
Start: 2019-03-14

## 2019-03-21 ENCOUNTER — OFFICE VISIT (OUTPATIENT)
Dept: INTERNAL MEDICINE CLINIC | Age: 54
End: 2019-03-21

## 2019-03-21 VITALS
SYSTOLIC BLOOD PRESSURE: 134 MMHG | HEART RATE: 78 BPM | TEMPERATURE: 97.9 F | RESPIRATION RATE: 18 BRPM | WEIGHT: 133 LBS | DIASTOLIC BLOOD PRESSURE: 83 MMHG | HEIGHT: 65 IN | BODY MASS INDEX: 22.16 KG/M2 | OXYGEN SATURATION: 98 %

## 2019-03-21 DIAGNOSIS — N20.0 NEPHROLITHIASIS: ICD-10-CM

## 2019-03-21 DIAGNOSIS — Q61.5 MEDULLARY SPONGE KIDNEY: ICD-10-CM

## 2019-03-21 DIAGNOSIS — M79.10 MYALGIA: ICD-10-CM

## 2019-03-21 DIAGNOSIS — M79.7 FIBROMYALGIA: ICD-10-CM

## 2019-03-21 DIAGNOSIS — E78.5 DYSLIPIDEMIA: Primary | ICD-10-CM

## 2019-03-21 DIAGNOSIS — R73.03 PREDIABETES: ICD-10-CM

## 2019-03-21 NOTE — PROGRESS NOTES
1. Have you been to the ER, urgent care clinic since your last visit? Hospitalized since your last visit? Yes Reason for visit: abdominal pain (righr side)    2. Have you seen or consulted any other health care providers outside of the [address] [place] [address] since your last visit? Include any pap smears or colon screening.  Yes Where: [facility]

## 2019-03-21 NOTE — PROGRESS NOTES
[facility]OR[facility] MEDICINE AND PRIMARY CARE  Dodie Nieves MD, [address] [address]  Phone:  [phone]  Fax: [phone]       Chief Complaint   Patient presents with    Abdominal Pain     right side   . SUBJECTIVE:    Dave Mora is a 52 y.o. female Patient returns today with known history of medullary sponge kidney, bilateral nephrolithiasis by CT scan in 2016, lumbar radiculopathy, GERD, fibromyalgia, fatigue, dyslipidemia, and is seen for evaluation. Since we last saw her she was at [facility] for left and right flank discomfort. She was found to have nephrolithiasis of both, most likely UTI. She had CT scan and ultrasound of the abdomen. She was placed on Keflex and comes in for follow up. She continues to have some left flank discomfort. Current Outpatient Medications   Medication Sig Dispense Refill    rosuvastatin (CRESTOR) 10 mg tablet Take 1 Tab by mouth nightly.  27 Tab 11     Past Medical History:   Diagnosis Date    BPV (benign positional vertigo)     Breast pain     Cigarette smoker     stopped 1-1-14    Dense breast     Dyslipidemia     Fatigue     Fibromyalgia     GERD (gastroesophageal reflux disease)     HA (headache)     Headache     Hypercholesterolemia     Left forearm pain     Lumbar radiculopathy, acute 06/13/2017    Medullary sponge kidney     Myalgia     Nephrolithiasis 10/22/2016    Normal cardiac stress test 12-10-15    STRESS ECHO    Prediabetes     UTI (lower urinary tract infection)      Past Surgical History:   Procedure Laterality Date    HX GYN      HX LITHOTRIPSY       Allergies   Allergen Reactions    Penicillins Hives         REVIEW OF SYSTEMS:  General: negative for - chills or fever  ENT: negative for - headaches, nasal congestion or tinnitus  Respiratory: negative for - cough, hemoptysis, shortness of breath or wheezing  Cardiovascular : negative for - chest pain, edema, palpitations or shortness of breath  Gastrointestinal: negative for - abdominal pain, blood in stools, heartburn or nausea/vomiting  Genito-Urinary: no dysuria, trouble voiding, or hematuria  Musculoskeletal: negative for - gait disturbance, joint pain, joint stiffness or joint swelling  Neurological: no TIA or stroke symptoms  Hematologic: no bruises, no bleeding, no swollen glands  Integument: no lumps, mole changes, nail changes or rash  Endocrine: no malaise/lethargy or unexpected weight changes      Social History     Socioeconomic History    Marital status: SINGLE     Spouse name: Not on file    Number of children: Not on file    Years of education: Not on file    Highest education level: Not on file   Tobacco Use    Smoking status: Never Smoker    Smokeless tobacco: Never Used   Substance and Sexual Activity    Alcohol use: No     Alcohol/week: 0.0 oz    Drug use: No     Family History   Problem Relation Age of Onset    Cancer Father        OBJECTIVE:    Visit Vitals  /83 (BP 1 Location: Right arm, BP Patient Position: Sitting)   Pulse 78   Temp 97.9 °F (36.6 °C) (Oral)   Resp 18   Ht 5' 5\" (1.651 m)   Wt 133 lb (60.3 kg)   SpO2 98%   BMI 22.13 kg/m²     CONSTITUTIONAL: well , well nourished, appears age appropriate  EYES: perrla, eom intact  ENMT:moist mucous membranes, pharynx clear  NECK: supple. Thyroid normal  RESPIRATORY: Chest: clear bilaterally   CARDIOVASCULAR: Heart: regular rate and rhythm  GASTROINTESTINAL: Abdomen: soft, bowel sounds active  HEMATOLOGIC: no pathological lymph nodes palpated  MUSCULOSKELETAL: Extremities: no edema, pulse 1+   INTEGUMENT: No unusual rashes or suspicious skin lesions noted. Nails appear normal.  NEUROLOGIC: non-focal exam   MENTAL STATUS: alert and oriented, appropriate affect           ASSESSMENT:  1. Dyslipidemia    2. Nephrolithiasis    3. Myalgia    4. Prediabetes    5. Fibromyalgia    6.  Medullary sponge kidney      Tomorrow will check a urine culture to be sure that the Keflex is going to resolve the UTI. She is rather impressively tender on the right kidney as of yet. If it has not, then we will treat her with whatever antibiotic will take care of the organism. She is agreeable with the plan. We further tell her to increase her fluid intake. Her blood pressure control is at goal.    BMI is at goal.    Because of history of medullary sponge kidney, it is important that she drink plenty of fluids. No blood will be taken today as it was taken in the emergency room and will check on the renal function when we get the reports, and advise her if they should be something different we will need to change. Patient is agreeable with the plan and we will see her again in six months, sooner if she has any problems. I have discussed the diagnosis with the patient and the intended plan as seen in the  orders above. The patient understands and agees with the plan. The patient has   received an after visit summary and questions were answered concerning  future plans  Patient labs and/or xrays were reviewed  Past records were reviewed. PLAN:Review of [facility] records indicate patient was seen by Estefani Yeung. Irma Petty MD, on 03/19/19 with final impressions of abdominal pain, UTI. Urine culture and UA were obtained, or requested. UA shows questionable UTI. CT scan showed fibroids, non obstructing kidney stones, and she felt better by the time of discharge. We reviewed the microbiology data and find that a culture was not sent. Therefore we ask that she contact us if the symptoms do not resolve as we will repeat the UA and then a culture to see if there is an organism that was not covered. .    Follow-up and Dispositions    · Return in about 6 months (around 9/21/2019). ATTENTION:   This medical record was transcribed using an electronic medical records system. Although proofread, it may and can contain electronic and spelling errors.   Other human spelling and other errors may be present. Corrections may be executed at a later time. Please feel free to contact us for any clarifications as needed.

## 2019-08-08 ENCOUNTER — OFFICE VISIT (OUTPATIENT)
Dept: INTERNAL MEDICINE CLINIC | Age: 54
End: 2019-08-08

## 2019-08-08 VITALS
TEMPERATURE: 98 F | HEIGHT: 65 IN | WEIGHT: 136 LBS | OXYGEN SATURATION: 98 % | SYSTOLIC BLOOD PRESSURE: 134 MMHG | DIASTOLIC BLOOD PRESSURE: 80 MMHG | BODY MASS INDEX: 22.66 KG/M2 | RESPIRATION RATE: 18 BRPM | HEART RATE: 80 BPM

## 2019-08-08 DIAGNOSIS — Z79.891 CHRONIC PRESCRIPTION OPIATE USE: ICD-10-CM

## 2019-08-08 DIAGNOSIS — N39.0 LOWER URINARY TRACT INFECTIOUS DISEASE: ICD-10-CM

## 2019-08-08 DIAGNOSIS — R73.03 PREDIABETES: ICD-10-CM

## 2019-08-08 DIAGNOSIS — M79.7 FIBROMYALGIA: ICD-10-CM

## 2019-08-08 DIAGNOSIS — E78.5 DYSLIPIDEMIA: ICD-10-CM

## 2019-08-08 DIAGNOSIS — N20.0 NEPHROLITHIASIS: ICD-10-CM

## 2019-08-08 DIAGNOSIS — Q61.5 MEDULLARY SPONGE KIDNEY: Primary | ICD-10-CM

## 2019-08-08 RX ORDER — ACETAMINOPHEN AND CODEINE PHOSPHATE 300; 30 MG/1; MG/1
1 TABLET ORAL
Qty: 60 TAB | Refills: 0 | Status: SHIPPED | OUTPATIENT
Start: 2019-08-08 | End: 2019-09-07

## 2019-08-08 RX ORDER — ROSUVASTATIN CALCIUM 10 MG/1
10 TABLET, COATED ORAL
Qty: 30 TAB | Refills: 11 | Status: SHIPPED | OUTPATIENT
Start: 2019-08-08 | End: 2021-02-15

## 2019-08-08 NOTE — PROGRESS NOTES
SPORTS MEDICINE AND PRIMARY CARE  Lea Rahman MD, [address] [address]  Phone:  [phone]  Fax: [phone]       Chief Complaint   Patient presents with    Back Pain   . SUBJECTIVE:    Bri Newman is a 62 y.o. female Patient returns today with known history of medullary sponge kidney, fibromyalgia, dyslipidemia, depression, nephrolithiasis, prediabetes, UTIs, and is seen for evaluation. Patient returns today saying she was having pain in her body and it was so bad she went to [facility] maybe two months ago, was given medication, for which she has taken it all, does not recall what it was, but it did not help all that much. She also states the pain is just worse, it is in her shoulders, back and even her feet. She cannot hardly work because of severity of the pain. She also wants to be sure she does not have a UTI and is seen for evaluation. Patient recalls that she tried Gabapentin and maybe one other that did not work. She saw the urologist and was given acetaminophen, which seemed to help. Patient is seen for evaluation. Current Outpatient Medications   Medication Sig Dispense Refill    rosuvastatin (CRESTOR) 10 mg tablet Take 1 Tab by mouth nightly. 30 Tab 11    acetaminophen-codeine (TYLENOL #3) 300-30 mg per tablet Take 1 Tab by mouth every four (4) hours as needed for Pain for up to 30 days. Max Daily Amount: 6 Tabs.  61 Tab 0     Past Medical History:   Diagnosis Date    BPV (benign positional vertigo)     Breast pain     Cigarette smoker     stopped 1-1-14    Dense breast     Dyslipidemia     Fatigue     Fibromyalgia     GERD (gastroesophageal reflux disease)     HA (headache)     Headache     Hypercholesterolemia     Left forearm pain     Lumbar radiculopathy, acute 06/13/2017    Medullary sponge kidney     Myalgia     Nephrolithiasis 10/22/2016    Normal cardiac stress test 12-10-15    STRESS ECHO    Prediabetes  UTI (lower urinary tract infection)      Past Surgical History:   Procedure Laterality Date    HX GYN      HX LITHOTRIPSY       Allergies   Allergen Reactions    Penicillins Hives         REVIEW OF SYSTEMS:  General: negative for - chills or fever  ENT: negative for - headaches, nasal congestion or tinnitus  Respiratory: negative for - cough, hemoptysis, shortness of breath or wheezing  Cardiovascular : negative for - chest pain, edema, palpitations or shortness of breath  Gastrointestinal: negative for - abdominal pain, blood in stools, heartburn or nausea/vomiting  Genito-Urinary: no dysuria, trouble voiding, or hematuria  Musculoskeletal: negative for - gait disturbance, joint pain, joint stiffness or joint swelling  Neurological: no TIA or stroke symptoms  Hematologic: no bruises, no bleeding, no swollen glands  Integument: no lumps, mole changes, nail changes or rash  Endocrine: no malaise/lethargy or unexpected weight changes      Social History     Socioeconomic History    Marital status: SINGLE     Spouse name: Not on file    Number of children: Not on file    Years of education: Not on file    Highest education level: Not on file   Tobacco Use    Smoking status: Never Smoker    Smokeless tobacco: Never Used   Substance and Sexual Activity    Alcohol use: No     Alcohol/week: 0.0 standard drinks    Drug use: No     Family History   Problem Relation Age of Onset    Cancer Father        OBJECTIVE:    Visit Vitals  /80   Pulse 80   Temp 98 °F (36.7 °C) (Oral)   Resp 18   Ht 5' 5\" (1.651 m)   Wt 136 lb (61.7 kg)   SpO2 98%   BMI 22.63 kg/m²     CONSTITUTIONAL: well , well nourished, appears age appropriate  EYES: perrla, eom intact  ENMT:moist mucous membranes, pharynx clear  NECK: supple.  Thyroid normal  RESPIRATORY: Chest: clear bilaterally   CARDIOVASCULAR: Heart: regular rate and rhythm  GASTROINTESTINAL: Abdomen: soft, bowel sounds active  HEMATOLOGIC: no pathological lymph nodes palpated  MUSCULOSKELETAL: Extremities: no edema, pulse 1+   INTEGUMENT: No unusual rashes or suspicious skin lesions noted. Nails appear normal.  NEUROLOGIC: non-focal exam   MENTAL STATUS: alert and oriented, appropriate affect           ASSESSMENT:  1. Medullary sponge kidney    2. Fibromyalgia    3. Dyslipidemia    4. Nephrolithiasis    5. Prediabetes    6. Lower urinary tract infectious disease    7. Chronic prescription opiate use      Patient specifically requests a check for UTI. She has medullary sponge kidney and therefore at risk for recurrent UTIs. I think fibromyalgia is flaring. She has multiple trigger points. She specifically requests codeine. We advised her we can give her a short supply, but she would have to see a pain management doctor if she wants to stay on it for a prolonged period of time and she specifically requests pain management referral.  The number of trigger points are compatible with fibromyalgia. BP control is at goal.  She will be back to see us in 2-3 months or as needed. We advised her of the state recommendations regarding narcotics. I have discussed the diagnosis with the patient and the intended plan as seen in the  orders above. The patient understands and agees with the plan. The patient has   received an after visit summary and questions were answered concerning  future plans  Patient labs and/or xrays were reviewed  Past records were reviewed. PLAN:  .  Orders Placed This Encounter    CULTURE, URINE    PAIN MGMT PANEL W/REFL, UR    URINALYSIS W/ RFLX MICROSCOPIC    REFERRAL TO PAIN MANAGEMENT    rosuvastatin (CRESTOR) 10 mg tablet    acetaminophen-codeine (TYLENOL #3) 300-30 mg per tablet       Follow-up and Dispositions    · Return in about 3 months (around 11/8/2019). ATTENTION:   This medical record was transcribed using an electronic medical records system.   Although proofread, it may and can contain electronic and spelling errors. Other human spelling and other errors may be present. Corrections may be executed at a later time. Please feel free to contact us for any clarifications as needed.

## 2019-08-08 NOTE — PROGRESS NOTES
Chief Complaint   Patient presents with    Back Pain     1. Have you been to the ER, urgent care clinic since your last visit? Hospitalized since your last visit? Yes When: pt states it was 3 months ago Reason for visit: pain    2. Have you seen or consulted any other health care providers outside of the [address] [place] [address] since your last visit? Include any pap smears or colon screening.  Yes Where: [facility]     Pt would like to have urine tested she is having complaints of urine frequency

## 2019-08-10 LAB — BACTERIA UR CULT: NO GROWTH

## 2019-08-12 LAB
AMPHETAMINES UR QL SCN: NEGATIVE NG/ML
APPEARANCE UR: CLEAR
BACTERIA #/AREA URNS HPF: ABNORMAL /[HPF]
BARBITURATES UR QL SCN: NEGATIVE NG/ML
BENZODIAZ UR QL SCN: NEGATIVE NG/ML
BILIRUB UR QL STRIP: NEGATIVE
BZE UR QL SCN: NEGATIVE NG/ML
CANNABINOIDS UR QL SCN: NEGATIVE NG/ML
CASTS URNS QL MICRO: ABNORMAL /LPF
COLOR UR: YELLOW
CREAT UR-MCNC: 191.4 MG/DL (ref 20–300)
CRYSTALS URNS MICRO: ABNORMAL
EPI CELLS #/AREA URNS HPF: >10 /HPF (ref 0–10)
FENTANYL+NORFENTANYL UR QL SCN: NEGATIVE PG/ML
GLUCOSE UR QL: NEGATIVE
HGB UR QL STRIP: ABNORMAL
KETONES UR QL STRIP: NEGATIVE
LEUKOCYTE ESTERASE UR QL STRIP: NEGATIVE
MEPERIDINE UR QL: NEGATIVE NG/ML
METHADONE UR QL SCN: NEGATIVE NG/ML
MICRO URNS: ABNORMAL
MUCOUS THREADS URNS QL MICRO: PRESENT
NITRITE UR QL STRIP: NEGATIVE
OPIATES UR QL SCN: POSITIVE NG/ML
OXYCODONE+OXYMORPHONE UR QL SCN: NEGATIVE NG/ML
PCP UR QL: NEGATIVE NG/ML
PH UR STRIP: 5 [PH] (ref 5–7.5)
PH UR: 5.6 [PH] (ref 4.5–8.9)
PLEASE NOTE:, 733157: ABNORMAL
PROPOXYPH UR QL SCN: NEGATIVE NG/ML
PROT UR QL STRIP: NEGATIVE
RBC #/AREA URNS HPF: ABNORMAL /HPF (ref 0–2)
SP GR UR: 1.02
SP GR UR: 1.03 (ref 1–1.03)
TRAMADOL UR QL SCN: NEGATIVE NG/ML
UNIDENT CRYS URNS QL MICRO: PRESENT
UROBILINOGEN UR STRIP-MCNC: 1 MG/DL (ref 0.2–1)
WBC #/AREA URNS HPF: ABNORMAL /HPF (ref 0–5)

## 2019-08-13 DIAGNOSIS — R31.21 ASYMPTOMATIC MICROSCOPIC HEMATURIA: Primary | ICD-10-CM

## 2020-01-02 ENCOUNTER — OFFICE VISIT (OUTPATIENT)
Dept: INTERNAL MEDICINE CLINIC | Age: 55
End: 2020-01-02

## 2020-01-02 VITALS
RESPIRATION RATE: 16 BRPM | HEIGHT: 65 IN | BODY MASS INDEX: 22.17 KG/M2 | WEIGHT: 133.1 LBS | TEMPERATURE: 98 F | DIASTOLIC BLOOD PRESSURE: 80 MMHG | OXYGEN SATURATION: 98 % | HEART RATE: 66 BPM | SYSTOLIC BLOOD PRESSURE: 122 MMHG

## 2020-01-02 DIAGNOSIS — R73.03 PREDIABETES: ICD-10-CM

## 2020-01-02 DIAGNOSIS — R53.83 FATIGUE, UNSPECIFIED TYPE: ICD-10-CM

## 2020-01-02 DIAGNOSIS — Z12.11 SCREEN FOR COLON CANCER: ICD-10-CM

## 2020-01-02 DIAGNOSIS — G44.209 TENSION-TYPE HEADACHE, NOT INTRACTABLE, UNSPECIFIED CHRONICITY PATTERN: ICD-10-CM

## 2020-01-02 DIAGNOSIS — M79.7 FIBROMYALGIA: ICD-10-CM

## 2020-01-02 DIAGNOSIS — E78.5 DYSLIPIDEMIA: Primary | ICD-10-CM

## 2020-01-02 RX ORDER — AZITHROMYCIN 250 MG/1
250 TABLET, FILM COATED ORAL SEE ADMIN INSTRUCTIONS
Qty: 6 TAB | Refills: 1 | Status: SHIPPED | OUTPATIENT
Start: 2020-01-02 | End: 2020-01-07

## 2020-01-02 RX ORDER — GUAIFENESIN 600 MG/1
600 TABLET, EXTENDED RELEASE ORAL 2 TIMES DAILY
Qty: 60 TAB | Refills: 11 | OUTPATIENT
Start: 2020-01-02 | End: 2020-05-09

## 2020-01-02 NOTE — PROGRESS NOTES
1. Have you been to the ER, urgent care clinic since your last visit? Hospitalized since your last visit? Yes When: 12-5-19 Reason for visit: sore throat    2. Have you seen or consulted any other health care providers outside of the [address] [place] [address] since your last visit? Include any pap smears or colon screening.  Yes Where: [facility]      Wants to discuss hospital visit

## 2020-01-03 LAB
ALBUMIN SERPL-MCNC: 4.2 G/DL (ref 3.5–5.5)
ALBUMIN/GLOB SERPL: 1.6 {RATIO} (ref 1.2–2.2)
ALP SERPL-CCNC: 117 IU/L (ref 39–117)
ALT SERPL-CCNC: 13 IU/L (ref 0–32)
APPEARANCE UR: CLEAR
AST SERPL-CCNC: 14 IU/L (ref 0–40)
BASOPHILS # BLD AUTO: 0 X10E3/UL (ref 0–0.2)
BASOPHILS NFR BLD AUTO: 1 %
BILIRUB SERPL-MCNC: 0.2 MG/DL (ref 0–1.2)
BILIRUB UR QL STRIP: NEGATIVE
BUN SERPL-MCNC: 14 MG/DL (ref 6–24)
BUN/CREAT SERPL: 23 (ref 9–23)
CALCIUM SERPL-MCNC: 10.2 MG/DL (ref 8.7–10.2)
CHLORIDE SERPL-SCNC: 103 MMOL/L (ref 96–106)
CHOLEST SERPL-MCNC: 236 MG/DL (ref 100–199)
CO2 SERPL-SCNC: 24 MMOL/L (ref 20–29)
COLOR UR: YELLOW
CREAT SERPL-MCNC: 0.62 MG/DL (ref 0.57–1)
EOSINOPHIL # BLD AUTO: 0.1 X10E3/UL (ref 0–0.4)
EOSINOPHIL NFR BLD AUTO: 2 %
ERYTHROCYTE [DISTWIDTH] IN BLOOD BY AUTOMATED COUNT: 12.7 % (ref 12.3–15.4)
EST. AVERAGE GLUCOSE BLD GHB EST-MCNC: 111 MG/DL
GLOBULIN SER CALC-MCNC: 2.7 G/DL (ref 1.5–4.5)
GLUCOSE SERPL-MCNC: 75 MG/DL (ref 65–99)
GLUCOSE UR QL: NEGATIVE
HBA1C MFR BLD: 5.5 % (ref 4.8–5.6)
HCT VFR BLD AUTO: 36.9 % (ref 34–46.6)
HDLC SERPL-MCNC: 64 MG/DL
HGB BLD-MCNC: 12.1 G/DL (ref 11.1–15.9)
HGB UR QL STRIP: NEGATIVE
IMM GRANULOCYTES # BLD AUTO: 0 X10E3/UL (ref 0–0.1)
IMM GRANULOCYTES NFR BLD AUTO: 0 %
KETONES UR QL STRIP: NEGATIVE
LDLC SERPL CALC-MCNC: 153 MG/DL (ref 0–99)
LEUKOCYTE ESTERASE UR QL STRIP: NEGATIVE
LYMPHOCYTES # BLD AUTO: 2.3 X10E3/UL (ref 0.7–3.1)
LYMPHOCYTES NFR BLD AUTO: 40 %
MCH RBC QN AUTO: 30.6 PG (ref 26.6–33)
MCHC RBC AUTO-ENTMCNC: 32.8 G/DL (ref 31.5–35.7)
MCV RBC AUTO: 93 FL (ref 79–97)
MICRO URNS: NORMAL
MONOCYTES # BLD AUTO: 0.5 X10E3/UL (ref 0.1–0.9)
MONOCYTES NFR BLD AUTO: 9 %
NEUTROPHILS # BLD AUTO: 2.8 X10E3/UL (ref 1.4–7)
NEUTROPHILS NFR BLD AUTO: 48 %
NITRITE UR QL STRIP: NEGATIVE
PH UR STRIP: 6.5 [PH] (ref 5–7.5)
PLATELET # BLD AUTO: 228 X10E3/UL (ref 150–450)
POTASSIUM SERPL-SCNC: 4.6 MMOL/L (ref 3.5–5.2)
PROT SERPL-MCNC: 6.9 G/DL (ref 6–8.5)
PROT UR QL STRIP: NEGATIVE
RBC # BLD AUTO: 3.95 X10E6/UL (ref 3.77–5.28)
SODIUM SERPL-SCNC: 142 MMOL/L (ref 134–144)
SP GR UR: 1.02 (ref 1–1.03)
TRIGL SERPL-MCNC: 97 MG/DL (ref 0–149)
TSH SERPL DL<=0.005 MIU/L-ACNC: 1.38 UIU/ML (ref 0.45–4.5)
UROBILINOGEN UR STRIP-MCNC: 1 MG/DL (ref 0.2–1)
VLDLC SERPL CALC-MCNC: 19 MG/DL (ref 5–40)
WBC # BLD AUTO: 5.8 X10E3/UL (ref 3.4–10.8)

## 2020-04-10 ENCOUNTER — NURSE TRIAGE (OUTPATIENT)
Dept: OTHER | Facility: CLINIC | Age: 55
End: 2020-04-10

## 2020-04-10 NOTE — TELEPHONE ENCOUNTER
Reason for Disposition   General information question, no triage required and triager able to answer question    Protocols used: INFORMATION ONLY CALL-ADULT-AH    Caller was seen in ED early this morning, 2 am for sob, headache and vomiting. Did not get COVID testing, was told to call PCP for testing. She is now wanting to get a test. Informed caller she could go to a flu clinic, no information available on testing. Provided locations to flu clinic.

## 2020-04-15 ENCOUNTER — NURSE TRIAGE (OUTPATIENT)
Dept: OTHER | Facility: CLINIC | Age: 55
End: 2020-04-15

## 2020-05-09 ENCOUNTER — HOSPITAL ENCOUNTER (EMERGENCY)
Age: 55
Discharge: HOME | End: 2020-05-09
Attending: EMERGENCY MEDICINE | Admitting: EMERGENCY MEDICINE
Payer: COMMERCIAL

## 2020-05-09 VITALS
TEMPERATURE: 98.2 F | RESPIRATION RATE: 16 BRPM | OXYGEN SATURATION: 100 % | DIASTOLIC BLOOD PRESSURE: 90 MMHG | SYSTOLIC BLOOD PRESSURE: 156 MMHG | HEART RATE: 80 BPM

## 2020-05-09 DIAGNOSIS — R51.9 SINUS HEADACHE: Primary | ICD-10-CM

## 2020-05-09 DIAGNOSIS — R42 DIZZINESS: ICD-10-CM

## 2020-05-09 DIAGNOSIS — R11.11 NON-INTRACTABLE VOMITING WITHOUT NAUSEA, UNSPECIFIED VOMITING TYPE: ICD-10-CM

## 2020-05-09 LAB
GLUCOSE BLD STRIP.AUTO-MCNC: 89 MG/DL (ref 65–100)
SERVICE CMNT-IMP: NORMAL

## 2020-05-09 PROCEDURE — 74011250636 HC RX REV CODE- 250/636: Performed by: EMERGENCY MEDICINE

## 2020-05-09 PROCEDURE — 96372 THER/PROPH/DIAG INJ SC/IM: CPT

## 2020-05-09 PROCEDURE — 74011250637 HC RX REV CODE- 250/637: Performed by: EMERGENCY MEDICINE

## 2020-05-09 PROCEDURE — 82962 GLUCOSE BLOOD TEST: CPT

## 2020-05-09 PROCEDURE — 99283 EMERGENCY DEPT VISIT LOW MDM: CPT

## 2020-05-09 RX ORDER — GUAIFENESIN 1200 MG/1
1200 TABLET, EXTENDED RELEASE ORAL 2 TIMES DAILY
Qty: 20 TAB | Refills: 0 | Status: SHIPPED | OUTPATIENT
Start: 2020-05-09 | End: 2021-02-15

## 2020-05-09 RX ORDER — MECLIZINE HCL 12.5 MG 12.5 MG/1
25 TABLET ORAL
Status: COMPLETED | OUTPATIENT
Start: 2020-05-09 | End: 2020-05-09

## 2020-05-09 RX ORDER — AZITHROMYCIN 250 MG/1
TABLET, FILM COATED ORAL
Qty: 6 TAB | Refills: 0 | Status: SHIPPED | OUTPATIENT
Start: 2020-05-09 | End: 2020-05-14

## 2020-05-09 RX ORDER — KETOROLAC TROMETHAMINE 30 MG/ML
30 INJECTION, SOLUTION INTRAMUSCULAR; INTRAVENOUS
Status: COMPLETED | OUTPATIENT
Start: 2020-05-09 | End: 2020-05-09

## 2020-05-09 RX ORDER — ONDANSETRON 4 MG/1
4 TABLET, ORALLY DISINTEGRATING ORAL
Status: COMPLETED | OUTPATIENT
Start: 2020-05-09 | End: 2020-05-09

## 2020-05-09 RX ORDER — ONDANSETRON 4 MG/1
4 TABLET, ORALLY DISINTEGRATING ORAL
Qty: 8 TAB | Refills: 0 | Status: SHIPPED | OUTPATIENT
Start: 2020-05-09 | End: 2021-02-15

## 2020-05-09 RX ORDER — IBUPROFEN 600 MG/1
600 TABLET ORAL
Qty: 20 TAB | Refills: 0 | Status: SHIPPED | OUTPATIENT
Start: 2020-05-09 | End: 2021-02-15

## 2020-05-09 RX ADMIN — ONDANSETRON 4 MG: 4 TABLET, ORALLY DISINTEGRATING ORAL at 09:50

## 2020-05-09 RX ADMIN — MECLIZINE 25 MG: 12.5 TABLET ORAL at 09:50

## 2020-05-09 RX ADMIN — KETOROLAC TROMETHAMINE 30 MG: 30 INJECTION, SOLUTION INTRAMUSCULAR at 09:50

## 2020-05-09 NOTE — ED PROVIDER NOTES
18-year-old female presents from home via private vehicle with a chief complaint of headache and sinus pressure. Symptoms started a few days ago but got worse yesterday. The headache is located mostly in the frontal region on both sides. No radiation. She has had some nausea and vomiting. She also reports dizziness and generalized weakness. She is taking no medications at home for the symptoms. She denies any fever, cough, shortness of breath. She denies any chest pain, back pain abdominal pain. She does have increased sinus drainage mostly at night. She has had similar symptoms in the past attributed to a sinus infection.            Past Medical History:   Diagnosis Date    BPV (benign positional vertigo)     Breast pain     Cigarette smoker     stopped 1-1-14    Dense breast     Dyslipidemia     Fatigue     Fibromyalgia     GERD (gastroesophageal reflux disease)     HA (headache)     Headache     Hypercholesterolemia     Left forearm pain     Lumbar radiculopathy, acute 06/13/2017    Medullary sponge kidney     Myalgia     Nephrolithiasis 10/22/2016    Normal cardiac stress test 12-10-15    STRESS ECHO    Prediabetes     UTI (lower urinary tract infection)        Past Surgical History:   Procedure Laterality Date    HX GYN      HX LITHOTRIPSY           Family History:   Problem Relation Age of Onset    Cancer Father        Social History     Socioeconomic History    Marital status: SINGLE     Spouse name: Not on file    Number of children: Not on file    Years of education: Not on file    Highest education level: Not on file   Occupational History    Not on file   Social Needs    Financial resource strain: Not on file    Food insecurity     Worry: Not on file     Inability: Not on file    Transportation needs     Medical: Not on file     Non-medical: Not on file   Tobacco Use    Smoking status: Never Smoker    Smokeless tobacco: Never Used   Substance and Sexual Activity  Alcohol use: No     Alcohol/week: 0.0 standard drinks    Drug use: No    Sexual activity: Not Currently   Lifestyle    Physical activity     Days per week: Not on file     Minutes per session: Not on file    Stress: Not on file   Relationships    Social connections     Talks on phone: Not on file     Gets together: Not on file     Attends [employer] service: Not on file     Active member of club or organization: Not on file     Attends meetings of clubs or organizations: Not on file     Relationship status: Not on file    Intimate partner violence     Fear of current or ex partner: Not on file     Emotionally abused: Not on file     Physically abused: Not on file     Forced sexual activity: Not on file   Other Topics Concern    Not on file   Social History Narrative    Not on file         ALLERGIES: Penicillins    Review of Systems   Constitutional: Negative for fever. HENT: Negative for facial swelling. Eyes: Negative for visual disturbance. Respiratory: Negative for chest tightness. Cardiovascular: Negative for chest pain. Gastrointestinal: Negative for abdominal pain. Genitourinary: Negative for difficulty urinating and dysuria. Musculoskeletal: Negative for arthralgias. Skin: Negative for rash. Neurological: Negative for headaches. Hematological: Negative for adenopathy. Psychiatric/Behavioral: Negative for suicidal ideas. Vitals:    05/09/20 0928   BP: 156/90   Pulse: 80   Resp: 16   Temp: 98.2 °F (36.8 °C)   SpO2: 100%            Physical Exam  Vitals signs and nursing note reviewed. Constitutional:       General: She is not in acute distress. Appearance: She is well-developed. HENT:      Head: Normocephalic and atraumatic. Eyes:      General: No scleral icterus. Conjunctiva/sclera: Conjunctivae normal.      Pupils: Pupils are equal, round, and reactive to light. Neck:      Musculoskeletal: Normal range of motion and neck supple.    Cardiovascular: Rate and Rhythm: Normal rate. Heart sounds: No murmur. Pulmonary:      Effort: Pulmonary effort is normal. No respiratory distress. Abdominal:      General: There is no distension. Musculoskeletal: Normal range of motion. Skin:     General: Skin is warm and dry. Findings: No rash. Neurological:      Mental Status: She is alert and oriented to person, place, and time. MDM  Number of Diagnoses or Management Options  Dizziness:   Non-intractable vomiting without nausea, unspecified vomiting type:   Sinus headache:   Diagnosis management comments: Assessment: Patient with symptoms suggestive of a sinus headache or recurrent headache. Exam reassuring with normal vital signs and no evidence of any focal neurologic deficits. Patient has had some dizziness and vomiting also attributable to sinusitis or headache. No fevers. No suspicion for COVID. Will treat symptomatically and reassess. Amount and/or Complexity of Data Reviewed  Clinical lab tests: reviewed         10:07 AM  Pt feeling improved with medications. VS remain stable. Repeat exam unremarkable. Stable for discharge home with treatment for sinus headache and vomiting. RTED with worsening symptoms.     Procedures

## 2020-05-09 NOTE — ED TRIAGE NOTES
Patient reports pressure in her head and having a headache since yesterday. Patient reports it feels similar to the oast when she has had a sinus infection.  Patient also reports dizziness and nausea

## 2020-05-09 NOTE — LETTER
J Carlos Mercedes 53 
[address] [facility] [address] [id number] [phone] Work/School Note Date: 5/9/2020 To Whom It May concern: Narciso Amador was seen and treated today in the emergency room by the following provider(s): 
No providers found. Narciso Amador may return to work on 5/11/2020.  
 
Sincerely, 
 
 
 
 
Arthur Hernandez MD

## 2020-05-11 ENCOUNTER — PATIENT OUTREACH (OUTPATIENT)
Dept: FAMILY MEDICINE CLINIC | Age: 55
End: 2020-05-11

## 2020-05-11 ENCOUNTER — TELEPHONE (OUTPATIENT)
Dept: INTERNAL MEDICINE CLINIC | Age: 55
End: 2020-05-11

## 2020-05-11 NOTE — TELEPHONE ENCOUNTER
----- Message from Maria Del Carmen Golden sent at 5/11/2020  9:57 AM EDT -----  Regarding: Dr. Summer Cano Message/Vendor Calls    Caller's first and last name:Pt      Reason for call: Requesting to have a CT scan done on her head. Stated was seen in the ER 05/09/2020 due to dizziness and pain in head.        Callback required yes/no and why: Yes      Best contact number(s):[phone]      Details to clarify the request:      Maria Del Carmen Golden

## 2020-05-11 NOTE — PROGRESS NOTES
Patient contacted regarding recent discharge and COVID-19 risk   Care Transition Nurse/ Ambulatory Care Manager contacted the patient by telephone to perform post discharge assessment. Verified name and  with patient as identifiers. Patient has following risk factors of:Myaliga, Headache . CTN/ACM reviewed discharge instructions, medical action plan and red flags related to discharge diagnosis. Reviewed and educated them on any new and changed medications related to discharge diagnosis. Advised obtaining a 90-day supply of all daily and as-needed medications. Education provided regarding infection prevention, and signs and symptoms of COVID-19 and when to seek medical attention with patient who verbalized understanding. Discussed exposure protocols and quarantine from [address] you at higher risk for severe illness  and given an opportunity for questions and concerns. The patient agrees to contact the COVID-19 hotline [phone] or PCP office for questions related to their healthcare. CTN/ACM provided contact information for future reference. From CDC: Are you at higher risk for severe illness?  Wash your hands often.  Avoid close contact (6 feet, which is about two arm lengths) with people who are sick.  Put distance between yourself and other people if COVID-19 is spreading in your community.  Clean and disinfect frequently touched surfaces.  Avoid all cruise travel and non-essential air travel.  Call your healthcare professional if you have concerns about COVID-19 and your underlying condition or if you are sick. For more information on steps you can take to protect yourself, see CDC's How to Protect Yourself      Patient/family/caregiver given information for Marisabel Ventura and agrees to enroll yes  Patient's preferred e-mail:  [email or web address]  Patient's preferred phone number: [email or web address]  Based on Loop alert triggers, patient will be contacted by nurse care manager for worsening symptoms. Pt will be further monitored by COVID Loop Team based on severity of symptoms and risk factors.     Patient had PCP follow up on 5/11/20

## 2020-05-12 ENCOUNTER — VIRTUAL VISIT (OUTPATIENT)
Dept: INTERNAL MEDICINE CLINIC | Age: 55
End: 2020-05-12

## 2020-05-12 NOTE — PROGRESS NOTES
1. Have you been to the ER, urgent care clinic since your last visit? Hospitalized since your last visit? Yes When: 5-9-2020 [facility]: [facility] Reason for visit: headaches    2. Have you seen or consulted any other health care providers outside of the [address] [place] [address] since your last visit? Include any pap smears or colon screening.  No

## 2020-05-14 ENCOUNTER — VIRTUAL VISIT (OUTPATIENT)
Dept: INTERNAL MEDICINE CLINIC | Age: 55
End: 2020-05-14

## 2020-05-14 DIAGNOSIS — E78.5 DYSLIPIDEMIA: ICD-10-CM

## 2020-05-14 DIAGNOSIS — M79.7 FIBROMYALGIA: ICD-10-CM

## 2020-05-14 DIAGNOSIS — J01.80 OTHER ACUTE SINUSITIS, RECURRENCE NOT SPECIFIED: Primary | ICD-10-CM

## 2020-05-14 DIAGNOSIS — R73.03 PREDIABETES: ICD-10-CM

## 2020-05-14 NOTE — PROGRESS NOTES
1. Have you been to the ER, urgent care clinic since your last visit? Hospitalized since your last visit? Yes When: 5-9-2020 Where: [facility] Reason for visit: headaches    2. Have you seen or consulted any other health care providers outside of the [address] [place] [address] [place] since your last visit? Include any pap smears or colon screening.  No     ED follow up

## 2020-05-14 NOTE — PROGRESS NOTES
Debra Hicks is a 60 y.o. female who was seen by synchronous (real-time) audio-video technology on 5/14/2020. Consent: Debra Hicks, who was seen by synchronous (real-time) audio-video technology, and/or her healthcare decision maker, is aware that this patient-initiated, Telehealth encounter on 5/14/2020 is a billable service, with coverage as determined by her insurance carrier. She is aware that she may receive a bill and has provided verbal consent to proceed: Yes. Assessment & Plan:   Diagnoses and all orders for this visit:    1. Other acute sinusitis, recurrence not specified headache seems to be resolving with the current treatment and we encouraged to finish the course of antibiotics. 2. Fibromyalgia not a current complaint today although she has a history of recurrent episodes. 3. Dyslipidemia cholesterol is controlled with Crestor and on January 2, 2020 total cholesterol 236, HDL 64,  with a hemoglobin A1c of 5.5. We will be checking her cholesterol the next visit and titrate the Crestor accordingly. 4. Prediabetes she has no evidence of prediabetes currently with a last hemoglobin A1c in the normal range. We have reassured her regarding the coronavirus suggesting that she does not have symptoms to support the diagnosis. We will see her again in about 3 months or as needed. Subjective:   Debra Hicks is a 60 y.o. female who was seen for ED Follow-up  Since we last saw her she is been to the emergency room on 2 3 different occasions. Twice to [facility] and the most recent visit to [facility] where she saw Monse Linda MD for terrible headache was given ibuprofen, Zofran, and azithromycin. She is felt to have a sinus headache. She is feeling better today and is seen for evaluation. She is afraid of the coronavirus. Prior to Admission medications    Medication Sig Start Date End Date Taking?  Authorizing Provider   ibuprofen (MOTRIN) 600 mg tablet Take 1 Tab by mouth every six (6) hours as needed for Pain. 5/9/20  Yes Lashaun Regalado MD   ondansetron (Zofran ODT) 4 mg disintegrating tablet Take 1 Tab by mouth every eight (8) hours as needed for Nausea. 5/9/20  Yes Lashaun Regalado MD   azithromycin (Zithromax Z-Bebo) 250 mg tablet Per pack instructions 5/9/20 5/14/20 Yes Lashaun Regalado MD   guaiFENesin (Mucinex) 1,200 mg Ta12 ER tablet Take 1 Tab by mouth two (2) times a day. 5/9/20  Yes Lashaun Regalado MD   sodium chloride (OCEAN) 0.65 % nasal squeeze bottle 0.1 mL by Both Nostrils route four (4) times daily. 1/2/20  Yes Isidro Perez MD   rosuvastatin (CRESTOR) 10 mg tablet Take 1 Tab by mouth nightly.  8/8/19   Lauren Snow MD     Allergies   Allergen Reactions    Penicillins Hives       REVIEW OF SYSTEMS as noted below except that noted in subjective:  General: negative for - chills or fever  ENT: negative for - headaches, nasal congestion or tinnitus  Respiratory: negative for - cough, hemoptysis, shortness of breath or wheezing  Cardiovascular : negative for - chest pain, edema, palpitations or shortness of breath  Gastrointestinal: negative for - abdominal pain, blood in stools, heartburn or nausea/vomiting  Genito-Urinary: no dysuria, trouble voiding, or hematuria  Musculoskeletal: negative for - gait disturbance, joint pain, joint stiffness or joint swelling  Neurological: no TIA or stroke symptoms  Hematologic: no bruises, no bleeding, no swollen glands  Integument: no lumps, mole changes, nail changes or rash  Endocrine:no malaise/lethargy or unexpected weight changes      Patient Active Problem List   Diagnosis Code    Dyslipidemia E78.5    Nephrolithiasis N20.0    Myalgia M79.10    Prediabetes R73.03    BPV (benign positional vertigo) H81.10    Fibromyalgia M79.7    HA (headache) R51    Dense breast R92.2    Breast pain N64.4    Chest pain R07.9    Fatigue R53.83    Weight loss R63.4    Depression F32.9    Medullary sponge kidney Q61.5    Normal cardiac stress test [id number]    Lower urinary tract infectious disease N39.0    Left forearm pain M79.632    Lumbar radiculopathy, acute M54.16    Other acute sinusitis J01.80     Patient Active Problem List    Diagnosis Date Noted    Other acute sinusitis 05/14/2020    Lumbar radiculopathy, acute 06/13/2017    Left forearm pain     Lower urinary tract infectious disease     Normal cardiac stress test 12/10/2015    Medullary sponge kidney     Chest pain 10/31/2015    Fatigue 10/31/2015    Weight loss 10/31/2015    Depression 10/31/2015    Dense breast     Breast pain     Fibromyalgia     HA (headache)     Prediabetes     BPV (benign positional vertigo)     Myalgia     Dyslipidemia     Nephrolithiasis      Current Outpatient Medications   Medication Sig Dispense Refill    ibuprofen (MOTRIN) 600 mg tablet Take 1 Tab by mouth every six (6) hours as needed for Pain. 20 Tab 0    ondansetron (Zofran ODT) 4 mg disintegrating tablet Take 1 Tab by mouth every eight (8) hours as needed for Nausea. 8 Tab 0    azithromycin (Zithromax Z-Bebo) 250 mg tablet Per pack instructions 6 Tab 0    guaiFENesin (Mucinex) 1,200 mg Ta12 ER tablet Take 1 Tab by mouth two (2) times a day. 20 Tab 0    sodium chloride (OCEAN) 0.65 % nasal squeeze bottle 0.1 mL by Both Nostrils route four (4) times daily. 45 mL 11    rosuvastatin (CRESTOR) 10 mg tablet Take 1 Tab by mouth nightly.  30 Tab 11     Allergies   Allergen Reactions    Penicillins Hives     Past Medical History:   Diagnosis Date    BPV (benign positional vertigo)     Breast pain     Cigarette smoker     stopped 1-1-14    Dense breast     Dyslipidemia     Fatigue     Fibromyalgia     GERD (gastroesophageal reflux disease)     HA (headache)     Headache     Hypercholesterolemia     Left forearm pain     Lumbar radiculopathy, acute 06/13/2017    Medullary sponge kidney     Myalgia     Nephrolithiasis 10/22/2016    Normal cardiac stress test 12-10-15    STRESS ECHO    Prediabetes     UTI (lower urinary tract infection)      Past Surgical History:   Procedure Laterality Date    HX GYN      HX LITHOTRIPSY       Family History   Problem Relation Age of Onset    Cancer Father      Social History     Tobacco Use    Smoking status: Never Smoker    Smokeless tobacco: Never Used   Substance Use Topics    Alcohol use: No     Alcohol/week: 0.0 standard drinks       ROS    Objective:   Vital Signs: (As obtained by patient/caregiver at home)  There were no vitals taken for this visit.      [INSTRUCTIONS:  \"[x]\" Indicates a positive item  \"[]\" Indicates a negative item  -- DELETE ALL ITEMS NOT EXAMINED]    Constitutional: [x] Appears well-developed and well-nourished [x] No apparent distress      [] Abnormal -     Mental status: [x] Alert and awake  [x] Oriented to person/place/time [x] Able to follow commands    [] Abnormal -     Eyes:   EOM    [x]  Normal    [] Abnormal -   Sclera  [x]  Normal    [] Abnormal -          Discharge [x]  None visible   [] Abnormal -     HENT: [x] Normocephalic, atraumatic  [] Abnormal -   [x] Mouth/Throat: Mucous membranes are moist    External Ears [x] Normal  [] Abnormal -    Neck: [x] No visualized mass [] Abnormal -     Pulmonary/Chest: [x] Respiratory effort normal   [x] No visualized signs of difficulty breathing or respiratory distress        [] Abnormal -      Musculoskeletal:   [x] Normal gait with no signs of ataxia         [x] Normal range of motion of neck        [] Abnormal -     Neurological:        [x] No Facial Asymmetry (Cranial nerve 7 motor function) (limited exam due to video visit)          [x] No gaze palsy        [] Abnormal -          Skin:        [x] No significant exanthematous lesions or discoloration noted on facial skin         [] Abnormal -            Psychiatric:       [x] Normal Affect [] Abnormal -        [x] No Hallucinations    Other pertinent observable physical exam findings:-        We discussed the expected course, resolution and complications of the diagnosis(es) in detail. Medication risks, benefits, costs, interactions, and alternatives were discussed as indicated. I advised her to contact the office if her condition worsens, changes or fails to improve as anticipated. She expressed understanding with the diagnosis(es) and plan. Debra Gill is a 60 y.o. female who was evaluated by a video visit encounter for concerns as above. Patient identification was verified prior to start of the visit. A caregiver was present when appropriate. Due to this being a TeleHealth encounter (During CKK-45 public health emergency), evaluation of the following organ systems was limited: Vitals/Constitutional/EENT/Resp/CV/GI//MS/Neuro/Skin/Heme-Lymph-Imm. Pursuant to the emergency declaration under the [facility] waiver authority and the [employer] Act, this Virtual  Visit was conducted, with patient's (and/or legal guardian's) consent, to reduce the patient's risk of exposure to COVID-19 and provide necessary medical care. Services were provided through a video synchronous discussion virtually to substitute for in-person clinic visit. Patient and provider were located at their individual homes. Amanda Perales MD    Please note that portions of this dictation may have been recorded with voice recognition software. Some unanticipated grammatical, syntax, homophones, and other interpretive errors are inadvertently transcribed by the computer software. An attempt at proof reading has been made to minimize errors and omissions. Please disregard these errors. Thank you.

## 2020-11-27 ENCOUNTER — OFFICE VISIT (OUTPATIENT)
Dept: OBGYN CLINIC | Age: 55
End: 2020-11-27
Payer: COMMERCIAL

## 2020-11-27 VITALS
WEIGHT: 137 LBS | BODY MASS INDEX: 22.82 KG/M2 | DIASTOLIC BLOOD PRESSURE: 80 MMHG | HEIGHT: 65 IN | SYSTOLIC BLOOD PRESSURE: 140 MMHG

## 2020-11-27 DIAGNOSIS — R10.2 PELVIC PAIN IN FEMALE: ICD-10-CM

## 2020-11-27 DIAGNOSIS — Z12.4 CERVICAL CANCER SCREENING: ICD-10-CM

## 2020-11-27 DIAGNOSIS — N94.10 DYSPAREUNIA, FEMALE: ICD-10-CM

## 2020-11-27 DIAGNOSIS — Z01.419 ENCOUNTER FOR GYNECOLOGICAL EXAMINATION (GENERAL) (ROUTINE) WITHOUT ABNORMAL FINDINGS: Primary | ICD-10-CM

## 2020-11-27 DIAGNOSIS — B00.9 HSV INFECTION: ICD-10-CM

## 2020-11-27 PROCEDURE — 99396 PREV VISIT EST AGE 40-64: CPT | Performed by: OBSTETRICS & GYNECOLOGY

## 2020-11-27 PROCEDURE — 99202 OFFICE O/P NEW SF 15 MIN: CPT | Performed by: OBSTETRICS & GYNECOLOGY

## 2020-11-27 RX ORDER — VALACYCLOVIR HYDROCHLORIDE 500 MG/1
500 TABLET, FILM COATED ORAL DAILY
Qty: 90 TAB | Refills: 3 | Status: SHIPPED | OUTPATIENT
Start: 2020-11-27 | End: 2022-05-03 | Stop reason: SDUPTHER

## 2020-11-27 NOTE — PATIENT INSTRUCTIONS
Well Visit, Women 48 to 72: Care Instructions  Your Care Instructions     Physical exams can help you stay healthy. Your doctor has checked your overall health and may have suggested ways to take good care of yourself. He or she also may have recommended tests. At home, you can help prevent illness with healthy eating, regular exercise, and other steps. Follow-up care is a key part of your treatment and safety. Be sure to make and go to all appointments, and call your doctor if you are having problems. It's also a good idea to know your test results and keep a list of the medicines you take. How can you care for yourself at home? · Reach and stay at a healthy weight. This will lower your risk for many problems, such as obesity, diabetes, heart disease, and high blood pressure. · Get at least 30 minutes of exercise on most days of the week. Walking is a good choice. You also may want to do other activities, such as running, swimming, cycling, or playing tennis or team sports. · Do not smoke. Smoking can make health problems worse. If you need help quitting, talk to your doctor about stop-smoking programs and medicines. These can increase your chances of quitting for good. · Protect your skin from too much sun. When you're outdoors from 10 a.m. to 4 p.m., stay in the shade or cover up with clothing and a hat with a wide brim. Wear sunglasses that block UV rays. Even when it's cloudy, put broad-spectrum sunscreen (SPF 30 or higher) on any exposed skin. · See a dentist one or two times a year for checkups and to have your teeth cleaned. · Wear a seat belt in the car. Follow your doctor's advice about when to have certain tests. These tests can spot problems early. · Cholesterol. Your doctor will tell you how often to have this done based on your age, family history, or other things that can increase your risk for heart attack and stroke. · Blood pressure.  Have your blood pressure checked during a routine doctor visit. Your doctor will tell you how often to check your blood pressure based on your age, your blood pressure results, and other factors. · Mammogram. Ask your doctor how often you should have a mammogram, which is an X-ray of your breasts. A mammogram can spot breast cancer before it can be felt and when it is easiest to treat. · Pap test and pelvic exam. Ask your doctor how often you should have a Pap test. You may not need to have a Pap test as often as you used to. · Vision. Have your eyes checked every year or two or as often as your doctor suggests. Some experts recommend that you have yearly exams for glaucoma and other age-related eye problems starting at age 48. · Hearing. Tell your doctor if you notice any change in your hearing. You can have tests to find out how well you hear. · Diabetes. Ask your doctor whether you should have tests for diabetes. · Colorectal cancer. Your risk for colorectal cancer gets higher as you get older. Some experts say that adults should start regular screening at age 48 and stop at age 76. Others say to start before age 48 or continue after age 76. Talk with your doctor about your risk and when to start and stop screening. · Thyroid disease. Talk to your doctor about whether to have your thyroid checked as part of a regular physical exam. Women have an increased chance of a thyroid problem. · Osteoporosis. You should begin tests for bone density at age 72. If you are younger than 72, ask your doctor whether you have factors that may increase your risk for this disease. You may want to have this test before age 72. · Heart attack and stroke risk. At least every 4 to 6 years, you should have your risk for heart attack and stroke assessed. Your doctor uses factors such as your age, blood pressure, cholesterol, and whether you smoke or have diabetes to show what your risk for a heart attack or stroke is over the next 10 years.   When should you call for help?  Watch closely for changes in your health, and be sure to contact your doctor if you have any problems or symptoms that concern you. Where can you learn more? Go to http://www.gray.com/  Enter [id number] in the search box to learn more about \"Well Visit, Women 50 to 72: Care Instructions. \"  Current as of: May 27, 2020               Content Version: 12.6  © 0772-5067 [employer], [employer]. Care instructions adapted under license by [employer] (which disclaims liability or warranty for this information). If you have questions about a medical condition or this instruction, always ask your healthcare professional. [address] any warranty or liability for your use of this information.

## 2020-11-27 NOTE — PROGRESS NOTES
Annual exam/Problem visit -- ages 40-58      Tiffanie Alejandra is a [id number],  60 y.o. female   No LMP recorded. Patient is postmenopausal.    She presents for her annual checkup. A couple problems--see below. With regard to the Gardasil vaccine, she is older than the FDA approved age to receive it. Menstrual status:    Her periods are absent in flow due to menopause     She reports no premenstrual symptoms. Contraception:    The current method of family planning is NA post menopause. Hormonal status:  She reports no perimenstrual type symptoms. She is not having vasomotor symptoms. The patient is not using any ERT. Sexual history:    She  reports previously being sexually active and has had partner(s) who are Male. She reports using the following method of birth control/protection: None. Medical conditions:    Since her last annual GYN exam about one year ago per pt, she has not the following changes in her health history: none. Surgical history confirmed with patient. has a past surgical history that includes hx lithotripsy. Pap and Mammogram History:    Her most recent Pap smear was normal, obtained 1 year(s) ago per pt    The patient has not had a recent mammogram but states she has one scheduled. Breast Cancer History/Substance Abuse: negative      Osteoporosis History:    Family history does not include a first or second degree relative with osteopenia or osteoporosis.     A bone density scan has not been obtained    Past Medical History:   Diagnosis Date    BPV (benign positional vertigo)     Breast pain     Cigarette smoker     stopped 1-1-14    Dense breast     Dyslipidemia     Fatigue     Fibromyalgia     GERD (gastroesophageal reflux disease)     HA (headache)     Headache     Hypercholesterolemia     Left forearm pain     Lumbar radiculopathy, acute 06/13/2017    Medullary sponge kidney     Myalgia     Nephrolithiasis 10/22/2016    Normal cardiac stress test 12-10-15    STRESS ECHO    Prediabetes     UTI (lower urinary tract infection)      Past Surgical History:   Procedure Laterality Date    HX LITHOTRIPSY         Current Outpatient Medications   Medication Sig Dispense Refill    ibuprofen (MOTRIN) 600 mg tablet Take 1 Tab by mouth every six (6) hours as needed for Pain. 20 Tab 0    ondansetron (Zofran ODT) 4 mg disintegrating tablet Take 1 Tab by mouth every eight (8) hours as needed for Nausea. 8 Tab 0    guaiFENesin (Mucinex) 1,200 mg Ta12 ER tablet Take 1 Tab by mouth two (2) times a day. 20 Tab 0    sodium chloride (OCEAN) 0.65 % nasal squeeze bottle 0.1 mL by Both Nostrils route four (4) times daily. 45 mL 11    rosuvastatin (CRESTOR) 10 mg tablet Take 1 Tab by mouth nightly. 30 Tab 11     Allergies: Penicillins     Tobacco History:  reports that she has never smoked. She has never used smokeless tobacco.  Alcohol Abuse:  reports no history of alcohol use. Drug Abuse:  reports no history of drug use.     Family Medical/Cancer History:   Family History   Problem Relation Age of Onset    Cancer Father         Review of Systems - History obtained from the patient  Constitutional: negative for weight loss, fever, night sweats  HEENT: negative for hearing loss, earache, congestion, snoring, sorethroat  CV: negative for chest pain, palpitations, edema  Resp: negative for cough, shortness of breath, wheezing  GI: negative for change in bowel habits, abdominal pain, black or bloody stools  : negative for frequency, dysuria, hematuria, vaginal discharge  MSK: negative for back pain, joint pain, muscle pain  Breast: negative for breast lumps, nipple discharge, galactorrhea  Skin :negative for itching, rash, hives  Neuro: negative for dizziness, headache, confusion, weakness  Psych: negative for anxiety, depression, change in mood  Heme/lymph: negative for bleeding, bruising, pallor    Physical Exam    Visit Vitals  BP (!) 140/80   Ht 5' 5\" (1.651 m)   Wt 137 lb (62.1 kg)   BMI 22.80 kg/m²       Constitutional  · Appearance: well-nourished, well developed, alert, in no acute distress    HENT  · Head and Face: appears normal    Neck  · Inspection/Palpation: normal appearance, no masses or tenderness  · Lymph Nodes: no lymphadenopathy present  · Thyroid: gland size normal, nontender, no nodules or masses present on palpation    Chest  · Respiratory Effort: breathing unlabored  · Auscultation: normal breath sounds    Cardiovascular  · Heart:  · Auscultation: regular rate and rhythm without murmur    Breasts  · Inspection of Breasts: breasts symmetrical, no skin changes, no discharge present, nipple appearance normal, no skin retraction present  · Palpation of Breasts and Axillae: no masses present on palpation, no breast tenderness  · Axillary Lymph Nodes: no lymphadenopathy present    Gastrointestinal  · Abdominal Examination: abdomen non-tender to palpation, normal bowel sounds, no masses present  · Liver and spleen: no hepatomegaly present, spleen not palpable  · Hernias: no hernias identified    Genitourinary  · External Genitalia: normal appearance for age, no discharge present, no tenderness present, no inflammatory lesions present, no masses present, no atrophy present  · Vagina: normal vaginal vault without central or paravaginal defects, no discharge present, no inflammatory lesions present, no masses present  · Bladder: non-tender to palpation  · Urethra: appears normal  · Cervix: normal   · Uterus: about normal size but irregular shape and consistency and tender  · Adnexa: no adnexal tenderness present, no adnexal masses present  · Perineum: perineum within normal limits, no evidence of trauma, no rashes or skin lesions present  · Anus: anus within normal limits, no hemorrhoids present  · Inguinal Lymph Nodes: no lymphadenopathy present    Skin  · General Inspection: no rash, no lesions identified    Neurologic/Psychiatric  · Mental Status:  · Orientation: grossly oriented to person, place and time  · Mood and Affect: mood normal, affect appropriate    Assessment:  Routine gynecologic examination  Her overall medical status is satisfactory except for gynecologic issues as below. Plan:  Counseled re: diet, exercise, healthy lifestyle  Return for yearly wellness visits  Rec annual mammogram    Problem visit    Subjective:  She is having pain at the opening of the vagina. She also wants to be tested for HSV. Has recurrent outbreaks of skin lesions on the vulva in the same spots. Lasts for 4-5 days and then heals completely. Also has pelvic pain and dyspareunia. Menopausal with no ERT. No h/o fibroids. Objective:  See above    Assessment:  Strong h/o likely HSV with recurrences  Dyspareunia and pelvic pain with possible uterine myomas. May be having dyspareunia from atrophy. Plan:  Rx Valtrex suppression. Check US and follow up office visit for possible myomas. Also could possibly do mammo at same time. I spent 25 minutes with the patient, more than half of which was face to face counseling.

## 2020-12-09 LAB
CYTOLOGIST CVX/VAG CYTO: ABNORMAL
CYTOLOGY CVX/VAG DOC CYTO: ABNORMAL
CYTOLOGY CVX/VAG DOC THIN PREP: ABNORMAL
CYTOLOGY HISTORY:: ABNORMAL
DX ICD CODE: ABNORMAL
DX ICD CODE: ABNORMAL
HPV I/H RISK 1 DNA CVX QL PROBE+SIG AMP: POSITIVE
Lab: ABNORMAL
Lab: ABNORMAL
OTHER STN SPEC: ABNORMAL
PATHOLOGIST CVX/VAG CYTO: ABNORMAL
STAT OF ADQ CVX/VAG CYTO-IMP: ABNORMAL

## 2020-12-10 NOTE — PROGRESS NOTES
Left voice message to call back.   We will do the colpo at your follow up visit on 12/15 per [facility]    Updated [address]

## 2020-12-15 NOTE — PROGRESS NOTES
Notified pt of results.  She verbalized understanding  She is scheduled for 12/29 for both colpo and ultrasound w/ FU

## 2020-12-18 ENCOUNTER — TELEPHONE (OUTPATIENT)
Dept: OBGYN CLINIC | Age: 55
End: 2020-12-18

## 2020-12-18 NOTE — TELEPHONE ENCOUNTER
Call received at 645am    60year old patient last seen in the office on 11/27/2020    Patient calling back to discuss further her recent lab results and next steps. Vaibhav Chapin MD   12/9/2020  7:58 AM EST      Pt needs colpo and bx   This nurse further reviewed MD reviewed labs results and recommendations. Patient was provided with instructions for the upcoming procedure and verbalized understanding.

## 2020-12-24 NOTE — PROGRESS NOTES
Heidi Zhao is a ,  60 y.o. female [address] whose No LMP recorded. Patient is postmenopausal.  was on  who presents with a history of dyspareunia for years. The pain is described as deep and during intercourse. Pain occurs primarily in the midline. The pain is located in the central pelvis. She reports the following associated symptoms: pressure on her bladder and urinary frequency. 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 She reports the following aggravating factors: only IC, amenorrheic post menopause. She reports the following alleviating factors:none    Results for orders placed or performed in visit on 20   US TRANSVAGINAL    Narrative    See impression. Impression    Impression: The final result for this exam can be located in this patient's chart with  results from [facility].        Past Medical History:   Diagnosis Date    Atypical squamous cells of undetermined significance (ASCUS) on Papanicolaou smear of cervix 2020    BPV (benign positional vertigo)     Breast pain     Cigarette smoker     stopped 14    Dense breast     Dyslipidemia     Fatigue     Fibromyalgia     GERD (gastroesophageal reflux disease)     HA (headache)     Headache     Hypercholesterolemia     Left forearm pain     Lumbar radiculopathy, acute 2017    Medullary sponge kidney     Myalgia     Nephrolithiasis 10/22/2016    Normal cardiac stress test 12-10-15    STRESS ECHO    Pap smear abnormality of cervix/human papillomavirus (HPV) positive 2020    Prediabetes     UTI (lower urinary tract infection)      Past Surgical History:   Procedure Laterality Date    HX COLPOSCOPY  2020    HX LITHOTRIPSY       Social History     Occupational History    Not on file   Tobacco Use    Smoking status: Never Smoker    Smokeless tobacco: Never Used   Substance and Sexual Activity    Alcohol use: No     Alcohol/week: 0.0 standard drinks    Drug use: No    Sexual activity: Not Currently     Partners: Male     Birth control/protection: None     Family History   Problem Relation Age of Onset    Cancer Father        Allergies   Allergen Reactions    Penicillins Hives     Prior to Admission medications    Medication Sig Start Date End Date Taking? Authorizing Provider   valACYclovir (VALTREX) 500 mg tablet Take 1 Tab by mouth daily. 11/27/20   Juan Francisco Tatum MD   ibuprofen (MOTRIN) 600 mg tablet Take 1 Tab by mouth every six (6) hours as needed for Pain. 5/9/20   Marta Munguia MD   ondansetron (Zofran ODT) 4 mg disintegrating tablet Take 1 Tab by mouth every eight (8) hours as needed for Nausea. 5/9/20   Marta Munguia MD   guaiFENesin (Mucinex) 1,200 mg Ta12 ER tablet Take 1 Tab by mouth two (2) times a day. 5/9/20   Marta Munguia MD   sodium chloride (OCEAN) 0.65 % nasal squeeze bottle 0.1 mL by Both Nostrils route four (4) times daily. 1/2/20   Ny Ramirez MD   rosuvastatin (CRESTOR) 10 mg tablet Take 1 Tab by mouth nightly. 8/8/19   yN Ramirez MD        Review of Systems: History obtained from the patient  Constitutional: negative for weight loss, fever, night sweats  GI: negative for change in bowel habits, abdominal pain, black or bloody stools  : negative for frequency, dysuria, hematuria, vaginal discharge  MSK: negative for back pain, joint pain, muscle pain  Skin: negative for itching, rash, hives  Neuro: negative for dizziness, headache, confusion, weakness  Psych: negative for anxiety, depression, change in mood  Heme/lymph: negative for bleeding, bruising, pallor    Objective: There were no vitals taken for this visit.     Physical Exam:   PHYSICAL EXAMINATION    Constitutional  · Appearance: well-nourished, well developed, alert, in no acute distress    HENT  · Head and Face: appears normal    Gastrointestinal  · Abdominal Examination: abdomen non-tender to palpation, normal bowel sounds, no masses present  · Liver and spleen: no hepatomegaly present, spleen not palpable  · Hernias: no hernias identified    Genitourinary  · External Genitalia: normal appearance for age, no discharge present, no tenderness present, no inflammatory lesions present, no masses present, no atrophy present  · Vagina: normal vaginal vault without central or paravaginal defects, no discharge present, no inflammatory lesions present, no masses present  · Bladder: non-tender to palpation  · Urethra: appears normal  · Cervix: normal--see colpo below   · Uterus: enlarged with 5 cm myoma just behind symphysis, tender to palpation and pressing on her bladder. · Adnexa: no adnexal tenderness present, no adnexal masses present  · Perineum: perineum within normal limits, no evidence of trauma, no rashes or skin lesions present  · Anus: anus within normal limits, no hemorrhoids present  · Inguinal Lymph Nodes: no lymphadenopathy present    Skin  · General Inspection: no rash, no lesions identified    Neurologic/Psychiatric  · Mental Status:  · Orientation: grossly oriented to person, place and time  · Mood and Affect: mood normal, affect appropriate    Assessment:   Fibroids as the direct cause of dyspareunia and bladder pressure and frequency. Plan:   Offered LTH/BSO. IC obtained. Pt will review info and proceed prn. RTO prn if symptoms persist or worsen. Instructions given to pt. Handouts given to pt. I spent 25 minutes with the patient, more than half of which was face to face counseling. Ultrasound followup    Jackson Fajardo is a 60 y.o. female is here today to review the results of her ultrasound evaluation. Her U/S evaluation is performed because of a previous encounter revealing fibroids which was identified 1 week ago. She is here for an initial ultrasound study.   The sonogram: UTERUS IS ANTEVERTED, NORMAL IN SIZE AND HETEROGENEOUS IN ECHOGENICITY. THERE APPEARS TO BE  FIBROIDS, THE MOST PROMINENT ARE MEASURED. FIBROID 1- ML INTRAMURAL WITH CALCIFICATIONS. FIBROID 2- LT LATERAL INTRAMURAL. UNABLE TO VISUALIZE THE ENDOMETRIUM DUE TO FIBROID SHADOWS.  RIGHT OVARY APPEARS WITHIN NORMAL LIMITS. LEFT OVARY IS NOT SEEN DUE TO BOWEL GAS. THE LEFT ADNEXA APPEARS WNL. NO FREE FLUID SEEN IN THE CDS. .    See detailed report for more information. [facility]  OFFICE PROCEDURE PROGRESS NOTE           Chart reviewed for the following:  I, Terrie Wilde, have reviewed the History, Physical and updated the Allergic reactions for Nicolase [address] 64 performed immediately prior to start of procedure:  Ruddy Duran, have performed the following reviews on Tc Bonilla prior to the start of the procedure:      * Patient was identified by name and date of birth   * Agreement on procedure being performed was verified  * Risks and Benefits explained to the patient  * Procedure site verified and marked as necessary  * Patient was positioned for comfort  * Consent was signed and verified      Time: 9:45am        Date of procedure: 12/24/2020     Procedure performed by: Ricky Garcia MD     Provider assisted by: Terrie Wilde LPN     Patient assisted by: self     How tolerated by patient: tolerated the procedure well with no complications     Post Procedural Pain Scale: 0 - No Hurt     Comments: none    Procedure Note: Colposcopy    Tc Bonilla is a G5 ,  60 y.o. female [address] No LMP recorded. Patient is postmenopausal.  She presents for colposcopy for evaluation of a cervical abnormality with a pap smear abnormality consisting of ASCUS and HPV. After being presented with the risks, benefits and alternatives has she signed a consent for the procedure. She states that she understands the need for the procedure and has no further questions.  She was informed that she may experience discomfort. Procedure:  She was positioned in the dorsal lithotomy position and a speculum was inserted into the vagina. Dilute acetic acid was applied to the cervix. The colposcope was used to visualize the cervix. Procedure: The transformation zone was completely visualized. Findings: This colposcopy was satisfactory. The procedure was notable for white epithelium on the cervix. Biopsies were taken from the cervix . See accompanying diagram for biopsy sites. Silver nitrate was applied to the cervix for hemostasis. Endocervical currettage: An endocervical curettage was performed. Post Procedure Status: The patient tolerated the procedure well with minimal discomfort. She was observed for 10 minutes and released in good condition.

## 2020-12-29 ENCOUNTER — OFFICE VISIT (OUTPATIENT)
Dept: OBGYN CLINIC | Age: 55
End: 2020-12-29
Payer: COMMERCIAL

## 2020-12-29 DIAGNOSIS — R87.618 PAP SMEAR ABNORMALITY OF CERVIX/HUMAN PAPILLOMAVIRUS (HPV) POSITIVE: ICD-10-CM

## 2020-12-29 DIAGNOSIS — R87.610 ATYPICAL SQUAMOUS CELLS OF UNDETERMINED SIGNIFICANCE (ASCUS) ON PAPANICOLAOU SMEAR OF CERVIX: Primary | ICD-10-CM

## 2020-12-29 DIAGNOSIS — R39.89 SENSATION OF PRESSURE IN BLADDER AREA: ICD-10-CM

## 2020-12-29 DIAGNOSIS — R10.2 PELVIC PAIN IN FEMALE: ICD-10-CM

## 2020-12-29 DIAGNOSIS — N94.10 DYSPAREUNIA, FEMALE: ICD-10-CM

## 2020-12-29 DIAGNOSIS — D25.1 INTRAMURAL LEIOMYOMA OF UTERUS: ICD-10-CM

## 2020-12-29 PROCEDURE — 57454 BX/CURETT OF CERVIX W/SCOPE: CPT | Performed by: OBSTETRICS & GYNECOLOGY

## 2020-12-29 PROCEDURE — 99214 OFFICE O/P EST MOD 30 MIN: CPT | Performed by: OBSTETRICS & GYNECOLOGY

## 2020-12-29 NOTE — PATIENT INSTRUCTIONS
Colposcopy: What to Expect at [facility] Your Recovery You may feel some soreness in your vagina for a day or two if you had a biopsy. Some vaginal bleeding or discharge is normal for up to a week after a biopsy. The discharge may be dark-colored if a solution was put on your cervix. You can use a sanitary pad for the bleeding. It may take a week or two for you to get the test results. This care sheet gives you a general idea about how long it will take for you to recover. But each person recovers at a different pace. Follow the steps below to feel better as quickly as possible. How can you care for yourself at home? Activity 
  · You can return to work and most daily activities right after the test.  
Exercise 
  · Do not exercise for 1 day after the test.  
Medicines 
  · Your doctor will tell you if and when you can restart your medicines. He or she will also give you instructions about taking any new medicines.  
  · If you take aspirin or some other blood thinner, ask your doctor if and when to start taking it again. Make sure that you understand exactly what your doctor wants you to do.  
  · Take an over-the-counter pain medicine, such as acetaminophen (Tylenol), ibuprofen (Advil, Motrin), or naproxen (Aleve). Be safe with medicines. Read and follow all instructions on the label. Do not take two or more pain medicines at the same time unless the doctor told you to. Many pain medicines have acetaminophen, which is Tylenol. Too much acetaminophen (Tylenol) can be harmful. Other instructions 
  · Use a pad if you have some bleeding.  
  · Do not douche, have sexual intercourse, or use tampons for 1 week if you had a biopsy.  This will allow time for your cervix to heal.  
  · You can take a bath or shower anytime after the test.  
 Follow-up care is a key part of your treatment and safety. Be sure to make and go to all appointments, and call your doctor if you are having problems. It's also a good idea to know your test results and keep a list of the medicines you take. When should you call for help? Call your doctor now or seek immediate medical care if: 
  · You have severe vaginal bleeding. This means that you are soaking through your usual pads or tampons each hour for 2 or more hours.  
  · You have pain that does not get better after you take pain medicine.  
  · You have signs of infection, such as: 
? Increased pain. ? Bad-smelling vaginal discharge. ? A fever. Watch closely for any changes in your health, and be sure to contact your doctor if: 
  · You have questions or concerns. Where can you learn more? Go to http://www.gray.com/ Enter M523 in the search box to learn more about \"Colposcopy: What to Expect at Home. \" Current as of: April 29, 2020               Content Version: 12.6 © 2006-2020 [employer]. Care instructions adapted under license by [employer] (which disclaims liability or warranty for this information). If you have questions about a medical condition or this instruction, always ask your healthcare professional. [address] any warranty or liability for your use of this information.

## 2021-01-05 DIAGNOSIS — R51.9 CHRONIC NONINTRACTABLE HEADACHE, UNSPECIFIED HEADACHE TYPE: Primary | ICD-10-CM

## 2021-01-05 DIAGNOSIS — G89.29 CHRONIC NONINTRACTABLE HEADACHE, UNSPECIFIED HEADACHE TYPE: Primary | ICD-10-CM

## 2021-01-05 LAB
CPT CODES, 490044: NORMAL
CPT DISCLAIMER: NORMAL
CYTOLOGY SPEC DOC CYTO: NORMAL
DIAGNOSIS SYNOPSIS:: NORMAL
DX ICD CODE: NORMAL
DX ICD CODE: NORMAL
PATH REPORT.GROSS SPEC: NORMAL
PATHOLOGIST NAME: NORMAL
PDF IMAGE, 807507: NORMAL
SPECIMEN SOURCE: NORMAL

## 2021-01-06 NOTE — PROGRESS NOTES
Patient sent a staff message indicating that she wanted a CT scan for headaches. Call the patient and leave a message on her answering machine that we have placed a request for the scan of her head.

## 2021-02-11 ENCOUNTER — OFFICE VISIT (OUTPATIENT)
Dept: OBGYN CLINIC | Age: 56
End: 2021-02-11
Payer: COMMERCIAL

## 2021-02-11 DIAGNOSIS — D25.1 INTRAMURAL LEIOMYOMA OF UTERUS: Primary | ICD-10-CM

## 2021-02-11 PROCEDURE — 99213 OFFICE O/P EST LOW 20 MIN: CPT | Performed by: OBSTETRICS & GYNECOLOGY

## 2021-02-11 NOTE — PROGRESS NOTES
Chief Complaint   Follow-up (ER follow up)    IDA Michael is a 60 y.o. female who presents for the evaluation of a positive beta HCG of 8. She went to the ER for N/V and headaches   No LMP recorded. Patient is postmenopausal.      Past Medical History:   Diagnosis Date    Atypical squamous cells of undetermined significance (ASCUS) on Papanicolaou smear of cervix 11/27/2020    BPV (benign positional vertigo)     Breast pain     Cigarette smoker     stopped 1-1-14    Dense breast     Dyslipidemia     Fatigue     Fibromyalgia     GERD (gastroesophageal reflux disease)     HA (headache)     Headache     Hypercholesterolemia     Left forearm pain     Lumbar radiculopathy, acute 06/13/2017    Medullary sponge kidney     Myalgia     Nephrolithiasis 10/22/2016    Normal cardiac stress test 12-10-15    STRESS ECHO    Pap smear abnormality of cervix/human papillomavirus (HPV) positive 11/27/2020    Prediabetes     UTI (lower urinary tract infection)      Past Surgical History:   Procedure Laterality Date    HX COLPOSCOPY  12/29/2020    HX LITHOTRIPSY       Social History     Occupational History    Not on file   Tobacco Use    Smoking status: Never Smoker    Smokeless tobacco: Never Used   Substance and Sexual Activity    Alcohol use: No     Alcohol/week: 0.0 standard drinks    Drug use: No    Sexual activity: Not Currently     Partners: Male     Birth control/protection: None     Family History   Problem Relation Age of Onset    Cancer Father        Allergies   Allergen Reactions    Penicillins Hives     Prior to Admission medications    Medication Sig Start Date End Date Taking? Authorizing Provider   valACYclovir (VALTREX) 500 mg tablet Take 1 Tab by mouth daily. 11/27/20   Louis Simon MD   ibuprofen (MOTRIN) 600 mg tablet Take 1 Tab by mouth every six (6) hours as needed for Pain.  5/9/20   Veena Jordan MD   ondansetron (Zofran ODT) 4 mg disintegrating tablet Take 1 Tab by mouth every eight (8) hours as needed for Nausea. 5/9/20   Amrik Rich MD   guaiFENesin (Mucinex) 1,200 mg Ta12 ER tablet Take 1 Tab by mouth two (2) times a day. 5/9/20   Amrik Rich MD   sodium chloride (OCEAN) 0.65 % nasal squeeze bottle 0.1 mL by Both Nostrils route four (4) times daily. 1/2/20   Colby Caceres MD   rosuvastatin (CRESTOR) 10 mg tablet Take 1 Tab by mouth nightly. 8/8/19   Colby Caceres MD        Review of Systems: History obtained from the patient  Constitutional: negative for weight loss, fever, night sweats  HEENT: negative for hearing loss, earache, congestion, snoring, sorethroat  CV: negative for chest pain, palpitations, edema  Resp: negative for cough, shortness of breath, wheezing  Breast: negative for breast lumps, nipple discharge, galactorrhea  GI: negative for change in bowel habits, abdominal pain, black or bloody stools  : negative for frequency, dysuria, hematuria, vaginal discharge  MSK: negative for back pain, joint pain, muscle pain  Skin: negative for itching, rash, hives  Neuro: negative for dizziness, headache, confusion, weakness  Psych: negative for anxiety, depression, change in mood  Heme/lymph: negative for bleeding, bruising, pallor    Objective: There were no vitals taken for this visit. Physical Exam:   PHYSICAL EXAMINATION    Constitutional  · Appearance: well-nourished, well developed, alert, in no acute distress    HENT  · Head and Face: appears normal    Skin  · General Inspection: no rash, no lesions identified    Neurologic/Psychiatric  · Mental Status:  · Orientation: grossly oriented to person, place and time  · Mood and Affect: mood normal, affect appropriate    Assessment:   Advised Quant of 8 is not pregnancy. Otherwise her fibroid symptoms are unchanged. Plan:   RTO prn. RTO prn if symptoms persist or worsen. Instructions given to pt. Handouts given to pt.

## 2021-02-11 NOTE — PATIENT INSTRUCTIONS
Uterine Fibroids: Care Instructions Your Care Instructions Uterine fibroids are growths in the uterus. Fibroids aren't cancer. Doctors don't know what causes fibroids. Fibroids are very common in women during their childbearing years. Fibroids can grow on the inside of the uterus, in the muscle wall of the uterus, or near the outside wall of the uterus. In some women, fibroids cause painful cramps and heavy periods. In these cases, taking anti-inflammatory medicines, birth control pills, or using an intrauterine device (IUD) often helps decrease symptoms. Sometimes surgery is needed to treat fibroids. But if you are near menopause, you may want to wait and see if your symptoms get better. Most fibroids shrink and go away after menopause, when your menstrual periods stop completely. Follow-up care is a key part of your treatment and safety. Be sure to make and go to all appointments, and call your doctor if you are having problems. It's also a good idea to know your test results and keep a list of the medicines you take. How can you care for yourself at home? · If your doctor gave you medicine, take it as exactly as prescribed. Be safe with medicines. Call your doctor if you think you are having a problem with your medicine. · Take anti-inflammatory medicines for pain. These include ibuprofen (Advil, Motrin) and naproxen (Aleve). Read and follow all instructions on the label. · Use heat, such as a hot water bottle or a heating pad set on low, or a warm bath to relax tense muscles and relieve cramping. Put a thin cloth between the heating pad and your skin. Never go to sleep with a heating pad on. · Lie down and put a pillow under your knees. Or, lie on your side and bring your knees up to your chest. These positions may help relieve belly pain or pressure. · Keep track of how many sanitary pads or tampons you use each day. · Get at least 30 minutes of exercise on most days of the week. Walking is a good choice. You also may want to do other activities, such as running, swimming, cycling, or playing tennis or team sports. · If you bleed longer than usual or have heavy bleeding, take a daily multivitamin with iron. When should you call for help? Call your doctor now or seek immediate medical care if: 
  · You have severe vaginal bleeding.  
  · You have new or worse belly or pelvic pain. Watch closely for changes in your health, and be sure to contact your doctor if: 
  · You have unusual vaginal bleeding.  
  · You do not get better as expected. Where can you learn more? Go to http://www.gray.com/ Enter B121 in the search box to learn more about \"Uterine Fibroids: Care Instructions. \" Current as of: November 8, 2019               Content Version: 12.6 © 2580-6265 [employer]. Care instructions adapted under license by [employer] (which disclaims liability or warranty for this information). If you have questions about a medical condition or this instruction, always ask your healthcare professional. [address] any warranty or liability for your use of this information.

## 2021-02-15 ENCOUNTER — OFFICE VISIT (OUTPATIENT)
Dept: INTERNAL MEDICINE CLINIC | Age: 56
End: 2021-02-15
Payer: COMMERCIAL

## 2021-02-15 VITALS
WEIGHT: 132.9 LBS | TEMPERATURE: 98.5 F | BODY MASS INDEX: 22.14 KG/M2 | HEIGHT: 65 IN | RESPIRATION RATE: 18 BRPM | SYSTOLIC BLOOD PRESSURE: 152 MMHG | DIASTOLIC BLOOD PRESSURE: 88 MMHG | HEART RATE: 82 BPM | OXYGEN SATURATION: 98 %

## 2021-02-15 DIAGNOSIS — M79.7 FIBROMYALGIA: Primary | ICD-10-CM

## 2021-02-15 DIAGNOSIS — Z12.11 SCREEN FOR COLON CANCER: ICD-10-CM

## 2021-02-15 DIAGNOSIS — E78.5 DYSLIPIDEMIA: ICD-10-CM

## 2021-02-15 DIAGNOSIS — R73.03 PREDIABETES: ICD-10-CM

## 2021-02-15 DIAGNOSIS — F32.A DEPRESSION, UNSPECIFIED DEPRESSION TYPE: ICD-10-CM

## 2021-02-15 DIAGNOSIS — Q61.5 MEDULLARY SPONGE KIDNEY: ICD-10-CM

## 2021-02-15 DIAGNOSIS — R92.2 DENSE BREAST: ICD-10-CM

## 2021-02-15 PROCEDURE — 36415 COLL VENOUS BLD VENIPUNCTURE: CPT | Performed by: INTERNAL MEDICINE

## 2021-02-15 PROCEDURE — 99214 OFFICE O/P EST MOD 30 MIN: CPT | Performed by: INTERNAL MEDICINE

## 2021-02-15 RX ORDER — FLUTICASONE PROPIONATE 50 MCG
2 SPRAY, SUSPENSION (ML) NASAL DAILY
Qty: 1 BOTTLE | Refills: 11 | Status: SHIPPED | OUTPATIENT
Start: 2021-02-15

## 2021-02-15 RX ORDER — CEFUROXIME AXETIL 500 MG/1
500 TABLET ORAL 2 TIMES DAILY
Qty: 20 TAB | Refills: 0 | Status: SHIPPED | OUTPATIENT
Start: 2021-02-15 | End: 2021-02-25

## 2021-02-15 RX ORDER — LOSARTAN POTASSIUM 50 MG/1
50 TABLET ORAL DAILY
Qty: 30 TAB | Refills: 11 | Status: SHIPPED | OUTPATIENT
Start: 2021-02-15 | End: 2022-01-13 | Stop reason: SDUPTHER

## 2021-02-15 NOTE — PROGRESS NOTES
SPORTS MEDICINE AND PRIMARY CARE  Mike Gould MD, [address] [address]  Phone:  [phone]  Fax: [phone]       Chief Complaint   Patient presents with   [facility]   . SUBJECTIVE:    Efrem Barnes is a 60 y.o. female Patient returns today with a known history of fibromyalgia, dyslipidemia, medullary sponge kidney, depression, prediabetes, dense breasts, and is seen for evaluation. Patient comes in today complaining of facial pain. She went to the [address] [place] [address] and had a urinalysis that was obtained that was negative, a CBC that was normal, and a CMP that was normal, serum HCG was positive, and she is seen for evaluation. Gynecologist is not concerned about the HCG, apparently he had just done a test and said it was okay. She also has excessive mucus. They did not give her any medications for the discomfort and she is seen for evaluation. Current Outpatient Medications   Medication Sig Dispense Refill    cefUROXime (CEFTIN) 500 mg tablet Take 1 Tab by mouth two (2) times a day for 10 days. 20 Tab 0    fluticasone propionate (FLONASE) 50 mcg/actuation nasal spray 2 Sprays by Both Nostrils route daily. 1 Bottle 11    losartan (COZAAR) 50 mg tablet Take 1 Tab by mouth daily. 30 Tab 11    valACYclovir (VALTREX) 500 mg tablet Take 1 Tab by mouth daily.  80 Tab 3     Past Medical History:   Diagnosis Date    Atypical squamous cells of undetermined significance (ASCUS) on Papanicolaou smear of cervix 11/27/2020    BPV (benign positional vertigo)     Breast pain     Cigarette smoker     stopped 1-1-14    Dense breast     Dyslipidemia     Fatigue     Fibromyalgia     GERD (gastroesophageal reflux disease)     HA (headache)     Headache     HTN (hypertension)     Hypercholesterolemia     Left forearm pain     Lumbar radiculopathy, acute 06/13/2017    Medullary sponge kidney     Myalgia     Nephrolithiasis 10/22/2016    Normal cardiac stress test 12-10-15    STRESS ECHO    Pap smear abnormality of cervix/human papillomavirus (HPV) positive 11/27/2020    Prediabetes     UTI (lower urinary tract infection)      Past Surgical History:   Procedure Laterality Date    HX COLPOSCOPY  12/29/2020    HX LITHOTRIPSY       Allergies   Allergen Reactions    Penicillins Hives         REVIEW OF SYSTEMS:  General: negative for - chills or fever  ENT: negative for - headaches, nasal congestion or tinnitus  Respiratory: negative for - cough, hemoptysis, shortness of breath or wheezing  Cardiovascular : negative for - chest pain, edema, palpitations or shortness of breath  Gastrointestinal: negative for - abdominal pain, blood in stools, heartburn or nausea/vomiting  Genito-Urinary: no dysuria, trouble voiding, or hematuria  Musculoskeletal: negative for - gait disturbance, joint pain, joint stiffness or joint swelling  Neurological: no TIA or stroke symptoms  Hematologic: no bruises, no bleeding, no swollen glands  Integument: no lumps, mole changes, nail changes or rash  Endocrine: no malaise/lethargy or unexpected weight changes      Social History     Socioeconomic History    Marital status: SINGLE     Spouse name: Not on file    Number of children: Not on file    Years of education: Not on file    Highest education level: Not on file   Tobacco Use    Smoking status: Never Smoker    Smokeless tobacco: Never Used   Substance and Sexual Activity    Alcohol use: No     Alcohol/week: 0.0 standard drinks    Drug use: No    Sexual activity: Not Currently     Partners: Male     Birth control/protection: None   Social History Narrative            Habits: She is a lifetime non-smoker nondrinker nondrug abuser        Social history patient single. Boyfriend lives with her. She completed high school and has worked at [employer] for 24 years. She has a 29-year-old son at 29-year-old daughter and 6 grandchildren she completed high school.   No Restoration preference. Family history: Father  age 46 lung cancer, mother 68 with a history of myocardial infarction high blood pressure and oxygen requiring respiratory failure 2 sisters and 1 brother are alive and well     Family History   Problem Relation Age of Onset    Cancer Father        OBJECTIVE:    Visit Vitals  BP (!) 152/88   Pulse 82   Temp 98.5 °F (36.9 °C) (Oral)   Resp 18   Ht 5' 5\" (1.651 m)   Wt 132 lb 14.4 oz (60.3 kg)   SpO2 98%   BMI 22.12 kg/m²     CONSTITUTIONAL: well , well nourished, appears age appropriate  EYES: perrla, eom intact  ENMT:moist mucous membranes, pharynx clear  NECK: supple. Thyroid normal  RESPIRATORY: Chest: clear bilaterally   CARDIOVASCULAR: Heart: regular rate and rhythm  GASTROINTESTINAL: Abdomen: soft, bowel sounds active  HEMATOLOGIC: no pathological lymph nodes palpated  MUSCULOSKELETAL: Extremities: no edema, pulse 1+   INTEGUMENT: No unusual rashes or suspicious skin lesions noted. Nails appear normal.  NEUROLOGIC: non-focal exam   MENTAL STATUS: alert and oriented, appropriate affect           ASSESSMENT:  1. Fibromyalgia    2. Dyslipidemia    3. Medullary sponge kidney    4. Depression, unspecified depression type    5. Prediabetes    6. Dense breast    7. Screen for colon cancer      No symptoms of flare of fibromyalgia. She stopped her Rosuvastatin as she ran out of refills. We will check her cholesterol today and expect to refill the Rosuvastatin. There is a history of medullary sponge kidney. Fortunately, her BUN is 12, creatinine 0.75, indicating normal renal function. History of prediabetes, random blood sugar was normal at 105. She has dense breasts, for which we recommend mammograms. She never has had a colon exam and we ask her to get that. Her facial pain is related to sinusitis and we give her nasal spray, as well as antibiotics to resolve it.     She has been off of work off and on because of discomfort and we suggest she get an FMLA form for us to complete so that when she does take off for a day or two she will be covered. She will be back to see us in six months, sooner if she has any problems. Elevated blood pressure. We will start on Losartan 50 mg daily. She will come by in two weeks for a blood pressure check and we will titrate her medications accordingly. I have discussed the diagnosis with the patient and the intended plan as seen in the  Orders. The patient understands and agees with the plan. The patient has   received an after visit summary and questions were answered concerning  future plans  Patient labs and/or xrays were reviewed  Past records were reviewed. PLAN:  .  Orders Placed This Encounter    JOÃO MAMMO BI SCREENING INCL CAD    LIPID PANEL    HCG QL SERUM    REFERRAL TO GASTROENTEROLOGY    cefUROXime (CEFTIN) 500 mg tablet    fluticasone propionate (FLONASE) 50 mcg/actuation nasal spray    losartan (COZAAR) 50 mg tablet       Follow-up and Dispositions    · Return in about 2 weeks (around 3/1/2021) for bp check. ATTENTION:   This medical record was transcribed using an electronic medical records system. Although proofread, it may and can contain electronic and spelling errors. Other human spelling and other errors may be present. Corrections may be executed at a later time. Please feel free to contact us for any clarifications as needed.

## 2021-02-15 NOTE — PROGRESS NOTES
1. Have you been to the ER, urgent care clinic since your last visit? Hospitalized since your last visit? Yes When: 2-9-21 Reason for visit: headaches    2. Have you seen or consulted any other health care providers outside of the [address] [place] [address] since your last visit? Include any pap smears or colon screening.  Yes Where: [facility]    Wants to discuss ED visit and headaches

## 2021-02-18 LAB
B-HCG SERPL QL: POSITIVE MIU/ML
CHOLEST SERPL-MCNC: 251 MG/DL (ref 100–199)
HDLC SERPL-MCNC: 75 MG/DL
LDLC SERPL CALC-MCNC: 163 MG/DL (ref 0–99)
TRIGL SERPL-MCNC: 77 MG/DL (ref 0–149)
VLDLC SERPL CALC-MCNC: 13 MG/DL (ref 5–40)

## 2021-02-19 DIAGNOSIS — E34.9 ELEVATED SERUM HCG IN FEMALE, NOT PREGNANT: Primary | ICD-10-CM

## 2021-02-19 RX ORDER — ROSUVASTATIN CALCIUM 10 MG/1
10 TABLET, COATED ORAL
Qty: 30 TAB | Refills: 11 | Status: SHIPPED | OUTPATIENT
Start: 2021-02-19 | End: 2022-01-27

## 2021-02-22 ENCOUNTER — CLINICAL SUPPORT (OUTPATIENT)
Dept: INTERNAL MEDICINE CLINIC | Age: 56
End: 2021-02-22

## 2021-02-22 DIAGNOSIS — E34.9 ELEVATED SERUM HCG IN FEMALE, NOT PREGNANT: Primary | ICD-10-CM

## 2021-02-24 LAB
FSH SERPL-ACNC: 148 MIU/ML
HCG INTACT+B SERPL-ACNC: 8 MIU/ML
LH SERPL-ACNC: 78.9 MIU/ML
PROLACTIN SERPL-MCNC: 4.3 NG/ML (ref 4.8–23.3)
TSH SERPL DL<=0.005 MIU/L-ACNC: 0.98 UIU/ML (ref 0.45–4.5)

## 2021-03-04 ENCOUNTER — HOSPITAL ENCOUNTER (OUTPATIENT)
Dept: MAMMOGRAPHY | Age: 56
Discharge: HOME | End: 2021-03-04
Attending: INTERNAL MEDICINE
Payer: COMMERCIAL

## 2021-03-04 DIAGNOSIS — R92.2 DENSE BREAST: ICD-10-CM

## 2021-03-04 PROCEDURE — 77067 SCR MAMMO BI INCL CAD: CPT

## 2022-01-12 ENCOUNTER — OFFICE VISIT (OUTPATIENT)
Dept: INTERNAL MEDICINE CLINIC | Age: 57
End: 2022-01-12
Payer: COMMERCIAL

## 2022-01-12 VITALS
HEART RATE: 95 BPM | SYSTOLIC BLOOD PRESSURE: 164 MMHG | BODY MASS INDEX: 20.81 KG/M2 | DIASTOLIC BLOOD PRESSURE: 99 MMHG | RESPIRATION RATE: 20 BRPM | OXYGEN SATURATION: 99 % | WEIGHT: 124.9 LBS | HEIGHT: 65 IN | TEMPERATURE: 99.8 F

## 2022-01-12 DIAGNOSIS — I10 PRIMARY HYPERTENSION: ICD-10-CM

## 2022-01-12 DIAGNOSIS — R92.2 DENSE BREAST: ICD-10-CM

## 2022-01-12 DIAGNOSIS — R73.02 IGT (IMPAIRED GLUCOSE TOLERANCE): Primary | ICD-10-CM

## 2022-01-12 DIAGNOSIS — E78.5 DYSLIPIDEMIA: ICD-10-CM

## 2022-01-12 DIAGNOSIS — Q61.5 MEDULLARY SPONGE KIDNEY: ICD-10-CM

## 2022-01-12 PROCEDURE — 99215 OFFICE O/P EST HI 40 MIN: CPT | Performed by: INTERNAL MEDICINE

## 2022-01-12 NOTE — PROGRESS NOTES
SPORTS MEDICINE AND PRIMARY CARE  Alexa Lagos MD, [address] [address]  Phone:  [phone]  Fax: [phone]       Chief Complaint   Patient presents with    Headache   . SUBJECTIVE:    Sunita Johnson is a 60 y.o. female Patient returns today with a known history of impaired glucose tolerance, primary hypertension, dyslipidemia, dense breasts, and complains of headaches, chills, nausea, coughing, productive, and fatigue. She has been feeling bad for the past four to five days. She complains of chills and fever. She has had both of her Pfizer vaccines. She is too soon for her booster. Patient is seen for evaluation. Current Outpatient Medications   Medication Sig Dispense Refill    rosuvastatin (CRESTOR) 10 mg tablet Take 1 Tab by mouth nightly. 30 Tab 11    fluticasone propionate (FLONASE) 50 mcg/actuation nasal spray 2 Sprays by Both Nostrils route daily. 1 Bottle 11    losartan (COZAAR) 50 mg tablet Take 1 Tab by mouth daily. 30 Tab 11    valACYclovir (VALTREX) 500 mg tablet Take 1 Tab by mouth daily. 90 Tab 3     Past Medical History:   Diagnosis Date    Atypical squamous cells of undetermined significance (ASCUS) on Papanicolaou smear of cervix 11/27/2020    BPV (benign positional vertigo)     Breast pain     Cigarette smoker     stopped 1-1-14    Dense breast     Dyslipidemia     Elevated serum hCG in female, not pregnant 02/18/2021    Although gynecological malignancies are a common reason for elevated serum hCG in nonpregnant women and has also been described as paraneoplastic syndrome and other malignancies including cancers of the bladder, kidney, GI tract, breast, and lung.   Another focus is the pituitary gland    Fatigue     Fibromyalgia     GERD (gastroesophageal reflux disease)     HA (headache)     Headache     HTN (hypertension)     Hypercholesterolemia     Left forearm pain     Lumbar radiculopathy, acute 06/13/2017    Medullary sponge kidney     Myalgia     Nephrolithiasis 10/22/2016    Normal cardiac stress test 12-10-15    STRESS ECHO    Pap smear abnormality of cervix/human papillomavirus (HPV) positive 11/27/2020    Prediabetes     UTI (lower urinary tract infection)      Past Surgical History:   Procedure Laterality Date    HX COLPOSCOPY  12/29/2020    HX LITHOTRIPSY       Allergies   Allergen Reactions    Penicillins Hives         REVIEW OF SYSTEMS:  General: negative for - chills or fever  ENT: negative for - headaches, nasal congestion or tinnitus  Respiratory: negative for - cough, hemoptysis, shortness of breath or wheezing  Cardiovascular : negative for - chest pain, edema, palpitations or shortness of breath  Gastrointestinal: negative for - abdominal pain, blood in stools, heartburn or nausea/vomiting  Genito-Urinary: no dysuria, trouble voiding, or hematuria  Musculoskeletal: negative for - gait disturbance, joint pain, joint stiffness or joint swelling  Neurological: no TIA or stroke symptoms  Hematologic: no bruises, no bleeding, no swollen glands  Integument: no lumps, mole changes, nail changes or rash  Endocrine: no malaise/lethargy or unexpected weight changes      Social History     Socioeconomic History    Marital status: SINGLE   Tobacco Use    Smoking status: Never Smoker    Smokeless tobacco: Never Used   Vaping Use    Vaping Use: Never used   Substance and Sexual Activity    Alcohol use: No     Alcohol/week: 0.0 standard drinks    Drug use: No    Sexual activity: Not Currently     Partners: Male     Birth control/protection: None   Social History Narrative            Habits: She is a lifetime non-smoker nondrinker nondrug abuser        Social history patient single. Boyfriend lives with her. She completed high school and has worked at [employer] for 24 years. She has a 63-year-old son at 63-year-old daughter and 6 grandchildren she completed high school.   No Restorationism preference. Family history: Father  age 46 lung cancer, mother 68 with a history of myocardial infarction high blood pressure and oxygen requiring respiratory failure 2 sisters and 1 brother are alive and well     Family History   Problem Relation Age of Onset    Cancer Father        OBJECTIVE:    Visit Vitals  BP (!) 164/99   Pulse 95   Temp 99.8 °F (37.7 °C) (Oral)   Resp 20   Ht 5' 5\" (1.651 m)   Wt 124 lb 14.4 oz (56.7 kg)   SpO2 99%   BMI 20.78 kg/m²     CONSTITUTIONAL: well , well nourished, appears age appropriate  EYES: perrla, eom intact  ENMT:moist mucous membranes, pharynx clear  NECK: supple. Thyroid normal  RESPIRATORY: Chest: clear bilaterally   CARDIOVASCULAR: Heart: regular rate and rhythm  GASTROINTESTINAL: Abdomen: soft, bowel sounds active  HEMATOLOGIC: no pathological lymph nodes palpated  MUSCULOSKELETAL: Extremities: no edema, pulse 1+   INTEGUMENT: No unusual rashes or suspicious skin lesions noted. Nails appear normal.  NEUROLOGIC: non-focal exam   MENTAL STATUS: alert and oriented, appropriate affect           ASSESSMENT:  1. IGT (impaired glucose tolerance)    2. Primary hypertension    3. Medullary sponge kidney    4. Dyslipidemia    5. Dense breast      With her current symptoms, they certainly suggest that she has COVID infection; if not a flu or an acute bronchitis, the latter of which we will treat with a Z-Bebo. She will go next door to check to see if she has COVID. We will do her blood tests on the next visit because of our concern that she may very well have COVID. She has impaired glucose tolerance, which has been stable. Blood pressure control is elevated and I think related to acute illness. We will find out with the next blood work in two weeks. She has medullary sponge kidney with kidney studies stable. She has dyslipidemia, for which we will check lipid panel. She has dense breasts by mammography and has mammography due in March.     She will be back to see me in two weeks. I have discussed the diagnosis with the patient and the intended plan as seen in the  Orders. The patient understands and agees with the plan. The patient has   received an after visit summary and questions were answered concerning  future plans  Patient labs and/or xrays were reviewed  Past records were reviewed. PLAN:  . No orders of the defined types were placed in this encounter. Follow-up and Dispositions    · Return in about 2 weeks (around 1/26/2022). ATTENTION:   This medical record was transcribed using an electronic medical records system. Although proofread, it may and can contain electronic and spelling errors. Other human spelling and other errors may be present. Corrections may be executed at a later time. Please feel free to contact us for any clarifications as needed.

## 2022-01-12 NOTE — PROGRESS NOTES
1. Have you been to the ER, urgent care clinic since your last visit? Hospitalized since your last visit? No    2. Have you seen or consulted any other health care providers outside of the [address] [place] [address] since your last visit? Include any pap smears or colon screening.  No     Complaints of headaches,chills,nausea, coughing up mucus and fatigue

## 2022-01-13 LAB
APPEARANCE UR: CLEAR
BILIRUB UR QL STRIP: NEGATIVE
COLOR UR: YELLOW
GLUCOSE UR QL: NEGATIVE
HGB UR QL STRIP: NEGATIVE
KETONES UR QL STRIP: NEGATIVE
LEUKOCYTE ESTERASE UR QL STRIP: NEGATIVE
MICRO URNS: NORMAL
NITRITE UR QL STRIP: NEGATIVE
PH UR STRIP: 7.5 [PH] (ref 5–7.5)
PROT UR QL STRIP: NEGATIVE
SP GR UR: 1.01 (ref 1–1.03)
UROBILINOGEN UR STRIP-MCNC: 1 MG/DL (ref 0.2–1)

## 2022-01-13 RX ORDER — ONDANSETRON 4 MG/1
4 TABLET, ORALLY DISINTEGRATING ORAL
Qty: 30 TABLET | Refills: 0 | Status: SHIPPED | OUTPATIENT
Start: 2022-01-13 | End: 2022-01-26

## 2022-01-13 RX ORDER — LOSARTAN POTASSIUM 50 MG/1
50 TABLET ORAL DAILY
Qty: 30 TABLET | Refills: 11 | Status: SHIPPED | OUTPATIENT
Start: 2022-01-13

## 2022-01-18 DIAGNOSIS — R51.9 NONINTRACTABLE HEADACHE, UNSPECIFIED CHRONICITY PATTERN, UNSPECIFIED HEADACHE TYPE: Primary | ICD-10-CM

## 2022-01-18 RX ORDER — CEFUROXIME AXETIL 500 MG/1
500 TABLET ORAL 2 TIMES DAILY
Qty: 20 TABLET | Refills: 0 | Status: SHIPPED | OUTPATIENT
Start: 2022-01-18 | End: 2022-01-26

## 2022-01-21 PROBLEM — U07.1 COVID-19 VIRUS INFECTION: Status: ACTIVE | Noted: 2022-01-12

## 2022-01-26 ENCOUNTER — OFFICE VISIT (OUTPATIENT)
Dept: INTERNAL MEDICINE CLINIC | Age: 57
End: 2022-01-26
Payer: COMMERCIAL

## 2022-01-26 VITALS
RESPIRATION RATE: 20 BRPM | HEART RATE: 87 BPM | BODY MASS INDEX: 21.09 KG/M2 | OXYGEN SATURATION: 98 % | TEMPERATURE: 98.5 F | HEIGHT: 65 IN | SYSTOLIC BLOOD PRESSURE: 120 MMHG | WEIGHT: 126.6 LBS | DIASTOLIC BLOOD PRESSURE: 70 MMHG

## 2022-01-26 DIAGNOSIS — E78.5 DYSLIPIDEMIA: ICD-10-CM

## 2022-01-26 DIAGNOSIS — Z12.11 SCREEN FOR COLON CANCER: ICD-10-CM

## 2022-01-26 DIAGNOSIS — R73.02 IGT (IMPAIRED GLUCOSE TOLERANCE): ICD-10-CM

## 2022-01-26 DIAGNOSIS — U07.1 COVID-19 VIRUS INFECTION: Primary | ICD-10-CM

## 2022-01-26 DIAGNOSIS — I10 PRIMARY HYPERTENSION: ICD-10-CM

## 2022-01-26 DIAGNOSIS — Q61.5 MEDULLARY SPONGE KIDNEY: ICD-10-CM

## 2022-01-26 DIAGNOSIS — M79.7 FIBROMYALGIA: ICD-10-CM

## 2022-01-26 PROCEDURE — 99213 OFFICE O/P EST LOW 20 MIN: CPT | Performed by: INTERNAL MEDICINE

## 2022-01-26 NOTE — PROGRESS NOTES
SPORTS MEDICINE AND PRIMARY CARE  Husam Mcelroy MD, [address] [address]  Phone:  [phone]  Fax: [phone]       Chief Complaint   Patient presents with    Concern For BUWIM-22 (Coronavirus)   . SUBJECTIVE:    Kathi Collazo is a 60 y.o. female Patient returns today and since we last saw her, she was found to be COVID positive. She has been off of work. She has a known history of impaired glucose tolerance, primary hypertension, medullary sponge kidney, fibromyalgia, dyslipidemia, and is seen for evaluation. Patient states she feels much better than when we last saw her. She had some problems in the frontal area, as well as the back of her neck. She plans to go back to work tomorrow. Other new complaints denied and patient is seen for evaluation. Current Outpatient Medications   Medication Sig Dispense Refill    cefUROXime (CEFTIN) 500 mg tablet Take 1 Tablet by mouth two (2) times a day for 10 days. 20 Tablet 0    losartan (COZAAR) 50 mg tablet Take 1 Tablet by mouth daily. 30 Tablet 11    ondansetron (ZOFRAN ODT) 4 mg disintegrating tablet Take 1 Tablet by mouth every eight (8) hours as needed for Nausea or Vomiting. 30 Tablet 0    rosuvastatin (CRESTOR) 10 mg tablet Take 1 Tab by mouth nightly. 30 Tab 11    fluticasone propionate (FLONASE) 50 mcg/actuation nasal spray 2 Sprays by Both Nostrils route daily. 1 Bottle 11    valACYclovir (VALTREX) 500 mg tablet Take 1 Tab by mouth daily.  80 Tab 3     Past Medical History:   Diagnosis Date    Atypical squamous cells of undetermined significance (ASCUS) on Papanicolaou smear of cervix 11/27/2020    BPV (benign positional vertigo)     Breast pain     Cigarette smoker     stopped 1-1-14    COVID-19 virus infection 01/12/2022    off work 1/11/22 - 1/27/22    Dense breast     Dyslipidemia     Elevated serum hCG in female, not pregnant 02/18/2021    Although gynecological malignancies are a common reason for elevated serum hCG in nonpregnant women and has also been described as paraneoplastic syndrome and other malignancies including cancers of the bladder, kidney, GI tract, breast, and lung.   Another focus is the pituitary gland    Fatigue     Fibromyalgia     GERD (gastroesophageal reflux disease)     HA (headache)     Headache     HTN (hypertension)     Hypercholesterolemia     Left forearm pain     Lumbar radiculopathy, acute 06/13/2017    Medullary sponge kidney     Myalgia     Nephrolithiasis 10/22/2016    Normal cardiac stress test 12-10-15    STRESS ECHO    Pap smear abnormality of cervix/human papillomavirus (HPV) positive 11/27/2020    Prediabetes     UTI (lower urinary tract infection)      Past Surgical History:   Procedure Laterality Date    HX COLPOSCOPY  12/29/2020    HX LITHOTRIPSY       Allergies   Allergen Reactions    Penicillins Hives         REVIEW OF SYSTEMS:  General: negative for - chills or fever  ENT: negative for - headaches, nasal congestion or tinnitus  Respiratory: negative for - cough, hemoptysis, shortness of breath or wheezing  Cardiovascular : negative for - chest pain, edema, palpitations or shortness of breath  Gastrointestinal: negative for - abdominal pain, blood in stools, heartburn or nausea/vomiting  Genito-Urinary: no dysuria, trouble voiding, or hematuria  Musculoskeletal: negative for - gait disturbance, joint pain, joint stiffness or joint swelling  Neurological: no TIA or stroke symptoms  Hematologic: no bruises, no bleeding, no swollen glands  Integument: no lumps, mole changes, nail changes or rash  Endocrine: no malaise/lethargy or unexpected weight changes      Social History     Socioeconomic History    Marital status: SINGLE   Tobacco Use    Smoking status: Never Smoker    Smokeless tobacco: Never Used   Vaping Use    Vaping Use: Never used   Substance and Sexual Activity    Alcohol use: No     Alcohol/week: 0.0 standard drinks    Drug use: No    Sexual activity: Not Currently     Partners: Male     Birth control/protection: None   Social History Narrative            Habits: She is a lifetime non-smoker nondrinker nondrug abuser        Social history patient single. She completed high school and has worked at [employer] for 24 years. She has a 80-year-old son at 80-year-old daughter and 6 grandchildren she completed high school. No Taoist preference. Family history: Father  age 46 lung cancer, mother 68 with a history of myocardial infarction high blood pressure and oxygen requiring respiratory failure 2 sisters and 1 brother are alive and well     Family History   Problem Relation Age of Onset    Cancer Father        OBJECTIVE:    Visit Vitals  BP (!) 143/87   Pulse 87   Temp 98.5 °F (36.9 °C) (Oral)   Resp 20   Ht 5' 5\" (1.651 m)   Wt 126 lb 9.6 oz (57.4 kg)   SpO2 98%   BMI 21.07 kg/m²     CONSTITUTIONAL: well , well nourished, appears age appropriate  EYES: perrla, eom intact  ENMT:moist mucous membranes, pharynx clear  NECK: supple. Thyroid normal  RESPIRATORY: Chest: clear bilaterally   CARDIOVASCULAR: Heart: regular rate and rhythm  GASTROINTESTINAL: Abdomen: soft, bowel sounds active  HEMATOLOGIC: no pathological lymph nodes palpated  MUSCULOSKELETAL: Extremities: no edema, pulse 1+   INTEGUMENT: No unusual rashes or suspicious skin lesions noted. Nails appear normal.  NEUROLOGIC: non-focal exam   MENTAL STATUS: alert and oriented, appropriate affect           ASSESSMENT:  1. COVID-19 virus infection    2. IGT (impaired glucose tolerance)    3. Primary hypertension    4. Medullary sponge kidney    5. Fibromyalgia    6. Dyslipidemia    7. Screen for colon cancer      Symptoms of COVID have all but subsided. She has an occipital headache and sinus headache and she is having trouble clearing her sinuses. We suggest humidifier. She has a note to return to work.   We suggest she check herself for COVID, although that is not a requirement for returning to work and we so advise her. This is just a recommendation from me. She has impaired glucose tolerance and we will check hemoglobin A1c today. Blood pressure control is at goal.    She has medullary sponge kidney and we will continue to follow her renal status. Fibromyalgia history, but no symptoms related to the disease. We will check lipid panel, as well as Apo-B, for dyslipidemia. Finally she agrees to a colonoscopy this year for colon cancer. We give her referral to GI. She will be back to see us in four to six months or as needed. Repeat blood pressure is 120/70. I have discussed the diagnosis with the patient and the intended plan as seen in the  Orders. The patient understands and agees with the plan. The patient has   received an after visit summary and questions were answered concerning  future plans  Patient labs and/or xrays were reviewed  Past records were reviewed. PLAN:  .  Orders Placed This Encounter    URINALYSIS W/ RFLX MICROSCOPIC    CBC WITH AUTOMATED DIFF    METABOLIC PANEL, COMPREHENSIVE    LIPID PANEL    HEMOGLOBIN A1C WITH EAG    APOLIPOPROTEIN B    REFERRAL TO GASTROENTEROLOGY       Follow-up and Dispositions    · Return in about 6 months (around 7/26/2022). ATTENTION:   This medical record was transcribed using an electronic medical records system. Although proofread, it may and can contain electronic and spelling errors. Other human spelling and other errors may be present. Corrections may be executed at a later time. Please feel free to contact us for any clarifications as needed.

## 2022-01-27 LAB
ALBUMIN SERPL-MCNC: 4.4 G/DL (ref 3.8–4.9)
ALBUMIN/GLOB SERPL: 1.8 {RATIO} (ref 1.2–2.2)
ALP SERPL-CCNC: 125 IU/L (ref 44–121)
ALT SERPL-CCNC: 43 IU/L (ref 0–32)
APO B SERPL-MCNC: 107 MG/DL
APPEARANCE UR: CLEAR
AST SERPL-CCNC: 32 IU/L (ref 0–40)
BASOPHILS # BLD AUTO: 0.1 X10E3/UL (ref 0–0.2)
BASOPHILS NFR BLD AUTO: 1 %
BILIRUB SERPL-MCNC: 0.3 MG/DL (ref 0–1.2)
BILIRUB UR QL STRIP: NEGATIVE
BUN SERPL-MCNC: 13 MG/DL (ref 6–24)
BUN/CREAT SERPL: 21 (ref 9–23)
CALCIUM SERPL-MCNC: 9.7 MG/DL (ref 8.7–10.2)
CHLORIDE SERPL-SCNC: 104 MMOL/L (ref 96–106)
CHOLEST SERPL-MCNC: 230 MG/DL (ref 100–199)
CO2 SERPL-SCNC: 28 MMOL/L (ref 20–29)
COLOR UR: YELLOW
CREAT SERPL-MCNC: 0.63 MG/DL (ref 0.57–1)
EOSINOPHIL # BLD AUTO: 0.2 X10E3/UL (ref 0–0.4)
EOSINOPHIL NFR BLD AUTO: 2 %
ERYTHROCYTE [DISTWIDTH] IN BLOOD BY AUTOMATED COUNT: 12.4 % (ref 11.7–15.4)
EST. AVERAGE GLUCOSE BLD GHB EST-MCNC: 123 MG/DL
GLOBULIN SER CALC-MCNC: 2.4 G/DL (ref 1.5–4.5)
GLUCOSE SERPL-MCNC: 77 MG/DL (ref 65–99)
GLUCOSE UR QL STRIP: NEGATIVE
HBA1C MFR BLD: 5.9 % (ref 4.8–5.6)
HCT VFR BLD AUTO: 37.5 % (ref 34–46.6)
HDLC SERPL-MCNC: 61 MG/DL
HGB BLD-MCNC: 11.9 G/DL (ref 11.1–15.9)
HGB UR QL STRIP: NEGATIVE
IMM GRANULOCYTES # BLD AUTO: 0 X10E3/UL (ref 0–0.1)
IMM GRANULOCYTES NFR BLD AUTO: 0 %
KETONES UR QL STRIP: NEGATIVE
LDLC SERPL CALC-MCNC: 153 MG/DL (ref 0–99)
LEUKOCYTE ESTERASE UR QL STRIP: NEGATIVE
LYMPHOCYTES # BLD AUTO: 3 X10E3/UL (ref 0.7–3.1)
LYMPHOCYTES NFR BLD AUTO: 38 %
MCH RBC QN AUTO: 29.8 PG (ref 26.6–33)
MCHC RBC AUTO-ENTMCNC: 31.7 G/DL (ref 31.5–35.7)
MCV RBC AUTO: 94 FL (ref 79–97)
MICRO URNS: NORMAL
MONOCYTES # BLD AUTO: 0.6 X10E3/UL (ref 0.1–0.9)
MONOCYTES NFR BLD AUTO: 7 %
NEUTROPHILS # BLD AUTO: 4.2 X10E3/UL (ref 1.4–7)
NEUTROPHILS NFR BLD AUTO: 52 %
NITRITE UR QL STRIP: NEGATIVE
PH UR STRIP: 7 [PH] (ref 5–7.5)
PLATELET # BLD AUTO: 309 X10E3/UL (ref 150–450)
POTASSIUM SERPL-SCNC: 4.2 MMOL/L (ref 3.5–5.2)
PROT SERPL-MCNC: 6.8 G/DL (ref 6–8.5)
PROT UR QL STRIP: NEGATIVE
RBC # BLD AUTO: 4 X10E6/UL (ref 3.77–5.28)
SODIUM SERPL-SCNC: 141 MMOL/L (ref 134–144)
SP GR UR STRIP: 1.02 (ref 1–1.03)
TRIGL SERPL-MCNC: 92 MG/DL (ref 0–149)
UROBILINOGEN UR STRIP-MCNC: 0.2 MG/DL (ref 0.2–1)
VLDLC SERPL CALC-MCNC: 16 MG/DL (ref 5–40)
WBC # BLD AUTO: 7.9 X10E3/UL (ref 3.4–10.8)

## 2022-01-27 RX ORDER — ROSUVASTATIN CALCIUM 20 MG/1
20 TABLET, COATED ORAL
Qty: 30 TABLET | Refills: 11 | Status: SHIPPED | OUTPATIENT
Start: 2022-01-27

## 2022-01-28 NOTE — PROGRESS NOTES
The APO B is another way of looking at your cholesterol. You will note it is elevated and along with the elevated total cholesterol and LDL cholesterol confirms the need to continue the rosuvastatin at 20 mg daily. We will reevaluate the cholesterol values in 3 to 4 months and see if we are at goal or if we need to make further adjustments  Rest of your lab values were acceptable.

## 2022-03-18 PROBLEM — E34.9 ELEVATED SERUM HCG IN FEMALE, NOT PREGNANT: Status: ACTIVE | Noted: 2021-02-18

## 2022-03-18 PROBLEM — J01.80 OTHER ACUTE SINUSITIS: Status: ACTIVE | Noted: 2020-05-14

## 2022-03-18 PROBLEM — R79.89 ELEVATED SERUM HCG IN FEMALE, NOT PREGNANT: Status: ACTIVE | Noted: 2021-02-18

## 2022-03-19 PROBLEM — U07.1 COVID-19 VIRUS INFECTION: Status: ACTIVE | Noted: 2022-01-12

## 2022-03-19 PROBLEM — M54.16 LUMBAR RADICULOPATHY, ACUTE: Status: ACTIVE | Noted: 2017-06-13

## 2022-04-28 NOTE — PROGRESS NOTES
Annual exam ages 40-58      Lloyd Luna is a G5 [id number],  59 y.o. female   No LMP recorded. Patient is postmenopausal.    She presents for her annual checkup. She is having pain with intercourse. With regard to the Gardasil vaccine, she is older than the FDA approved age to receive it. Menstrual status:    Her periods are absent in flow. Contraception:    The current method of family planning is NA post menopause. Hormonal status:  She reports no perimenstrual type symptoms. She is not having vasomotor symptoms. The patient is not using any ERT. Sexual history:    She  reports previously being sexually active and has had partner(s) who are male. She reports using the following method of birth control/protection: None. Medical conditions:    Since her last annual GYN exam about one year ago, she has not the following changes in her health history: none. Surgical history confirmed with patient. has a past surgical history that includes hx lithotripsy and hx colposcopy (12/29/2020). Pap and Mammogram History:    Her most recent Pap smear was abnormal, obtained 1 year(s) ago. The patient has not had a recent mammogram.    Breast Cancer History/Substance Abuse: negative      Osteoporosis History:    Family history does not include a first or second degree relative with osteopenia or osteoporosis.     Past Medical History:   Diagnosis Date    Atypical squamous cells of undetermined significance (ASCUS) on Papanicolaou smear of cervix 11/27/2020    BPV (benign positional vertigo)     Breast pain     Cigarette smoker     stopped 1-1-14    COVID-19 virus infection 01/12/2022    off work 1/11/22 - 1/27/22    Dense breast     Dyslipidemia     Elevated serum hCG in female, not pregnant 02/18/2021    Although gynecological malignancies are a common reason for elevated serum hCG in nonpregnant women and has also been described as paraneoplastic syndrome and other malignancies including cancers of the bladder, kidney, GI tract, breast, and lung. Another focus is the pituitary gland    Fatigue     Fibromyalgia     GERD (gastroesophageal reflux disease)     HA (headache)     Headache     HTN (hypertension)     Hypercholesterolemia     Left forearm pain     Lumbar radiculopathy, acute 06/13/2017    Medullary sponge kidney     Myalgia     Nephrolithiasis 10/22/2016    Normal cardiac stress test 12-10-15    STRESS ECHO    Pap smear abnormality of cervix/human papillomavirus (HPV) positive 11/27/2020    Prediabetes     UTI (lower urinary tract infection)      Past Surgical History:   Procedure Laterality Date    HX COLPOSCOPY  12/29/2020    HX LITHOTRIPSY         Current Outpatient Medications   Medication Sig Dispense Refill    rosuvastatin (CRESTOR) 20 mg tablet Take 1 Tablet by mouth nightly. 30 Tablet 11    losartan (COZAAR) 50 mg tablet Take 1 Tablet by mouth daily. 30 Tablet 11    fluticasone propionate (FLONASE) 50 mcg/actuation nasal spray 2 Sprays by Both Nostrils route daily. 1 Bottle 11    valACYclovir (VALTREX) 500 mg tablet Take 1 Tab by mouth daily. 90 Tab 3     Allergies: Penicillins     Tobacco History:  reports that she has never smoked. She has never used smokeless tobacco.  Alcohol Abuse:  reports no history of alcohol use. Drug Abuse:  reports no history of drug use.     Family Medical/Cancer History:   Family History   Problem Relation Age of Onset    Cancer Father         Review of Systems - History obtained from the patient  Constitutional: negative for weight loss, fever, night sweats  HEENT: negative for hearing loss, earache, congestion, snoring, sorethroat  CV: negative for chest pain, palpitations, edema  Resp: negative for cough, shortness of breath, wheezing  GI: negative for change in bowel habits, abdominal pain, black or bloody stools  : negative for frequency, dysuria, hematuria, vaginal discharge  MSK: negative for back pain, joint pain, muscle pain  Breast: negative for breast lumps, nipple discharge, galactorrhea  Skin :negative for itching, rash, hives  Neuro: negative for dizziness, headache, confusion, weakness  Psych: negative for anxiety, depression, change in mood  Heme/lymph: negative for bleeding, bruising, pallor    Physical Exam    There were no vitals taken for this visit.     Constitutional  · Appearance: well-nourished, well developed, alert, in no acute distress    HENT  · Head and Face: appears normal    Neck  · Inspection/Palpation: normal appearance, no masses or tenderness  · Lymph Nodes: no lymphadenopathy present  · Thyroid: gland size normal, nontender, no nodules or masses present on palpation    Chest  · Respiratory Effort: breathing unlabored  · Auscultation: normal breath sounds    Cardiovascular  · Heart:  · Auscultation: regular rate and rhythm without murmur    Breasts  · Inspection of Breasts: breasts symmetrical, no skin changes, no discharge present, nipple appearance normal, no skin retraction present  · Palpation of Breasts and Axillae: no masses present on palpation, no breast tenderness  · Axillary Lymph Nodes: no lymphadenopathy present    Gastrointestinal  · Abdominal Examination: abdomen non-tender to palpation, normal bowel sounds, no masses present  · Liver and spleen: no hepatomegaly present, spleen not palpable  · Hernias: no hernias identified    Genitourinary  · External Genitalia: normal appearance for age, no discharge present, no tenderness present, no inflammatory lesions present, no masses present, no atrophy present  · Vagina: normal vaginal vault without central or paravaginal defects, no discharge present, no inflammatory lesions present, no masses present  · Bladder: non-tender to palpation  · Urethra: appears normal  · Cervix: normal   · Uterus: normal size, shape and consistency  · Adnexa: no adnexal tenderness present, no adnexal masses present  · Perineum: perineum within normal limits, no evidence of trauma, no rashes or skin lesions present  · Anus: anus within normal limits, no hemorrhoids present  · Inguinal Lymph Nodes: no lymphadenopathy present    Skin  · General Inspection: no rash, no lesions identified    Neurologic/Psychiatric  · Mental Status:  · Orientation: grossly oriented to person, place and time  · Mood and Affect: mood normal, affect appropriate    Assessment:  Routine gynecologic examination  Her current medical status is satisfactory with no evidence of significant gynecologic issues except dyspareunia    Plan:  Rx estrace cream.  Counseled re: diet, exercise, healthy lifestyle  Return for yearly wellness visits  Rec annual mammogram

## 2022-05-03 ENCOUNTER — OFFICE VISIT (OUTPATIENT)
Dept: OBGYN CLINIC | Age: 57
End: 2022-05-03
Payer: COMMERCIAL

## 2022-05-03 VITALS — SYSTOLIC BLOOD PRESSURE: 160 MMHG | DIASTOLIC BLOOD PRESSURE: 98 MMHG | BODY MASS INDEX: 21.57 KG/M2 | WEIGHT: 129.6 LBS

## 2022-05-03 DIAGNOSIS — N94.10 DYSPAREUNIA, FEMALE: ICD-10-CM

## 2022-05-03 DIAGNOSIS — Z01.419 ENCOUNTER FOR GYNECOLOGICAL EXAMINATION (GENERAL) (ROUTINE) WITHOUT ABNORMAL FINDINGS: Primary | ICD-10-CM

## 2022-05-03 PROCEDURE — 99396 PREV VISIT EST AGE 40-64: CPT | Performed by: OBSTETRICS & GYNECOLOGY

## 2022-05-03 RX ORDER — ESTRADIOL 0.1 MG/G
CREAM VAGINAL
Qty: 42.5 G | Refills: 4 | Status: SHIPPED | OUTPATIENT
Start: 2022-05-03

## 2022-05-03 RX ORDER — VALACYCLOVIR HYDROCHLORIDE 500 MG/1
500 TABLET, FILM COATED ORAL DAILY
Qty: 90 TABLET | Refills: 3 | Status: SHIPPED | OUTPATIENT
Start: 2022-05-03

## 2022-05-08 LAB
CYTOLOGIST CVX/VAG CYTO: ABNORMAL
CYTOLOGY CVX/VAG DOC CYTO: ABNORMAL
CYTOLOGY CVX/VAG DOC THIN PREP: ABNORMAL
CYTOLOGY HISTORY:: ABNORMAL
DX ICD CODE: ABNORMAL
HPV GENOTYPE 18,45: NEGATIVE
HPV I/H RISK 4 DNA CVX QL PROBE+SIG AMP: POSITIVE
HPV16 DNA CVX QL PROBE+SIG AMP: NEGATIVE
Lab: ABNORMAL
OTHER STN SPEC: ABNORMAL
STAT OF ADQ CVX/VAG CYTO-IMP: ABNORMAL

## 2023-04-18 ENCOUNTER — OFFICE VISIT (OUTPATIENT)
Dept: INTERNAL MEDICINE CLINIC | Age: 58
End: 2023-04-18
Payer: COMMERCIAL

## 2023-04-18 VITALS
BODY MASS INDEX: 21.41 KG/M2 | HEIGHT: 65 IN | TEMPERATURE: 98.4 F | OXYGEN SATURATION: 100 % | RESPIRATION RATE: 20 BRPM | SYSTOLIC BLOOD PRESSURE: 145 MMHG | WEIGHT: 128.5 LBS | DIASTOLIC BLOOD PRESSURE: 78 MMHG | HEART RATE: 64 BPM

## 2023-04-18 DIAGNOSIS — R73.02 IGT (IMPAIRED GLUCOSE TOLERANCE): ICD-10-CM

## 2023-04-18 DIAGNOSIS — I10 PRIMARY HYPERTENSION: Primary | ICD-10-CM

## 2023-04-18 DIAGNOSIS — Q61.5 MEDULLARY SPONGE KIDNEY: ICD-10-CM

## 2023-04-18 DIAGNOSIS — E78.5 DYSLIPIDEMIA: ICD-10-CM

## 2023-04-18 DIAGNOSIS — Z12.11 SCREEN FOR COLON CANCER: ICD-10-CM

## 2023-04-18 PROBLEM — J01.80 OTHER ACUTE SINUSITIS: Status: RESOLVED | Noted: 2020-05-14 | Resolved: 2023-04-18

## 2023-04-18 PROBLEM — M54.16 LUMBAR RADICULOPATHY, ACUTE: Status: RESOLVED | Noted: 2017-06-13 | Resolved: 2023-04-18

## 2023-04-18 PROCEDURE — 3077F SYST BP >= 140 MM HG: CPT | Performed by: INTERNAL MEDICINE

## 2023-04-18 PROCEDURE — 99214 OFFICE O/P EST MOD 30 MIN: CPT | Performed by: INTERNAL MEDICINE

## 2023-04-18 PROCEDURE — 3078F DIAST BP <80 MM HG: CPT | Performed by: INTERNAL MEDICINE

## 2023-04-18 RX ORDER — LOSARTAN POTASSIUM 50 MG/1
50 TABLET ORAL DAILY
Qty: 90 TABLET | Refills: 3 | Status: SHIPPED | OUTPATIENT
Start: 2023-04-18

## 2023-04-18 RX ORDER — VALACYCLOVIR HYDROCHLORIDE 500 MG/1
500 TABLET, FILM COATED ORAL DAILY
Qty: 90 TABLET | Refills: 11 | Status: SHIPPED | OUTPATIENT
Start: 2023-04-18

## 2023-04-18 RX ORDER — ROSUVASTATIN CALCIUM 20 MG/1
20 TABLET, COATED ORAL
Qty: 90 TABLET | Refills: 3 | Status: SHIPPED | OUTPATIENT
Start: 2023-04-18

## 2023-04-18 RX ORDER — FLUTICASONE PROPIONATE 50 MCG
2 SPRAY, SUSPENSION (ML) NASAL DAILY
Qty: 1 EACH | Refills: 11 | Status: SHIPPED | OUTPATIENT
Start: 2023-04-18

## 2023-04-18 RX ORDER — ESTRADIOL 0.1 MG/G
CREAM VAGINAL
Qty: 42.5 G | Refills: 4 | Status: SHIPPED | OUTPATIENT
Start: 2023-04-18

## 2023-04-18 NOTE — PROGRESS NOTES
Dianelys Galicia is a 59 y.o. female    Chief Complaint   Patient presents with    Hypertension     1. Have you been to the ER, urgent care clinic since your last visit? Hospitalized since your last visit? No    2. Have you seen or consulted any other health care providers outside of the [address] [place] [address] since your last visit? Include any pap smears or colon screening.  No

## 2023-04-18 NOTE — PROGRESS NOTES
SPORTS MEDICINE AND PRIMARY CARE  Tata Cuevas MD, [address] [address]  Phone:  [phone]  Fax: [phone]       Chief Complaint   Patient presents with    Hypertension   . SUBJECTIVE:    Yanet Tripp is a 59 y.o. female Patient returns today with a known history of primary hypertension, impaired glucose tolerance, medullary sponge kidney, dyslipidemia and is seen for evaluation. She does not understand why her blood pressure is up, except that ever since her niece was killed it has been on her heart. She thinks about it every day. Her niece was shot. Other new complaints denied and patient is seen for evaluation. Current Outpatient Medications   Medication Sig Dispense Refill    valACYclovir (VALTREX) 500 mg tablet Take 1 Tablet by mouth daily. 90 Tablet 3    estradioL (ESTRACE) 0.01 % (0.1 mg/gram) vaginal cream Use 0.5 grams per applicator in vagina twice a week 42.5 g 4    rosuvastatin (CRESTOR) 20 mg tablet Take 1 Tablet by mouth nightly. 30 Tablet 11    losartan (COZAAR) 50 mg tablet Take 1 Tablet by mouth daily. 30 Tablet 11    fluticasone propionate (FLONASE) 50 mcg/actuation nasal spray 2 Sprays by Both Nostrils route daily. 1 Bottle 11     Past Medical History:   Diagnosis Date    Atypical squamous cells of undetermined significance (ASCUS) on Papanicolaou smear of cervix 11/27/2020    BPV (benign positional vertigo)     Breast pain     Cigarette smoker     stopped 1-1-14    COVID-19 virus infection 01/12/2022    off work 1/11/22 - 1/27/22    Dense breast     Dyslipidemia     Elevated serum hCG in female, not pregnant 02/18/2021    Although gynecological malignancies are a common reason for elevated serum hCG in nonpregnant women and has also been described as paraneoplastic syndrome and other malignancies including cancers of the bladder, kidney, GI tract, breast, and lung.   Another focus is the pituitary gland    Fatigue     Fibromyalgia     GERD (gastroesophageal reflux disease)     HA (headache)     Headache     HTN (hypertension)     Hypercholesterolemia     Left forearm pain     Lumbar radiculopathy, acute 06/13/2017    Medullary sponge kidney     Myalgia     Nephrolithiasis 10/22/2016    Normal cardiac stress test 12-10-15    STRESS ECHO    Pap smear abnormality of cervix/human papillomavirus (HPV) positive 11/27/2020    Prediabetes     UTI (lower urinary tract infection)      Past Surgical History:   Procedure Laterality Date    HX COLPOSCOPY  12/29/2020    HX LITHOTRIPSY       Allergies   Allergen Reactions    Penicillins Hives         REVIEW OF SYSTEMS:  General: negative for - chills or fever  ENT: negative for - headaches, nasal congestion or tinnitus  Respiratory: negative for - cough, hemoptysis, shortness of breath or wheezing  Cardiovascular : negative for - chest pain, edema, palpitations or shortness of breath  Gastrointestinal: negative for - abdominal pain, blood in stools, heartburn or nausea/vomiting  Genito-Urinary: no dysuria, trouble voiding, or hematuria  Musculoskeletal: negative for - gait disturbance, joint pain, joint stiffness or joint swelling  Neurological: no TIA or stroke symptoms  Hematologic: no bruises, no bleeding, no swollen glands  Integument: no lumps, mole changes, nail changes or rash  Endocrine: no malaise/lethargy or unexpected weight changes      Social History     Socioeconomic History    Marital status: SINGLE   Tobacco Use    Smoking status: Never    Smokeless tobacco: Never   Vaping Use    Vaping Use: Never used   Substance and Sexual Activity    Alcohol use: No     Alcohol/week: 0.0 standard drinks    Drug use: No    Sexual activity: Yes     Partners: Male     Birth control/protection: None   Social History Narrative            Habits: She is a lifetime non-smoker nondrinker nondrug abuser        Social history patient single. She completed high school and has worked at [employer] for 24 years.   She has a 43-year-old son at 43-year-old daughter and 6 grandchildren she completed high school. No Yarsani preference. 25yo niece gsw killed        Family history: Father  age 46 lung cancer, mother 68 with a history of myocardial infarction high blood pressure and oxygen requiring respiratory failure 2 sisters and 1 brother are alive and well     Social Determinants of Health     Financial Resource Strain: High Risk    Difficulty of Paying Living Expenses: Hard   Food Insecurity: Food Insecurity Present    Worried About Running Out of Food in the Last Year: Sometimes true    Ran Out of Food in the Last Year: Sometimes true   Transportation Needs: Unknown    Lack of Transportation (Medical): No   Housing Stability: High Risk    Unable to Pay for Housing in the Last Year: Yes     Family History   Problem Relation Age of Onset    Cancer Father        OBJECTIVE:    Visit Vitals  BP (!) 145/78 (BP 1 Location: Left upper arm, BP Patient Position: Sitting)   Pulse 64   Temp 98.4 °F (36.9 °C) (Oral)   Resp 20   Ht 5' 5\" (1.651 m)   Wt 128 lb 8 oz (58.3 kg)   SpO2 100%   BMI 21.38 kg/m²     CONSTITUTIONAL: well , well nourished, appears age appropriate  EYES: perrla, eom intact  ENMT:moist mucous membranes, pharynx clear  NECK: supple. Thyroid normal  RESPIRATORY: Chest: clear bilaterally   CARDIOVASCULAR: Heart: regular rate and rhythm  GASTROINTESTINAL: Abdomen: soft, bowel sounds active  HEMATOLOGIC: no pathological lymph nodes palpated  MUSCULOSKELETAL: Extremities: no edema, pulse 1+   INTEGUMENT: No unusual rashes or suspicious skin lesions noted. Nails appear normal.  NEUROLOGIC: non-focal exam   MENTAL STATUS: alert and oriented, appropriate affect           ASSESSMENT:  1. Primary hypertension    2. IGT (impaired glucose tolerance)    3. Medullary sponge kidney    4. Dyslipidemia      Blood pressure elevation related to compliance issues.   She will have her blood pressure checked at work after she starts her medication and if it is greater than 130/80, she will contact us with [facility] and we will increase the dose of Losartan. History of impaired glucose tolerance, for which we will check hemoglobin A1c. She has a known history of medullary sponge kidney and for that reason we check her renal panel on a regular basis. She has dyslipidemia and we will check her cholesterol, realizing she has not had a cholesterol pill for 2-3 months and therefore we will keep that in mind as we look at the results. She will be back to see us in one year, sooner if she has any problems. We encouraged physical activity 30 minutes five days a week and a heart healthy diet. I have discussed the diagnosis with the patient and the intended plan as seen in the  Orders. The patient understands and agees with the plan. The patient has   received an after visit summary and questions were answered concerning  future plans  Patient labs and/or xrays were reviewed  Past records were reviewed. PLAN:  . No orders of the defined types were placed in this encounter. Follow-up and Dispositions    Return in about 1 year (around 4/18/2024). ATTENTION:   This medical record was transcribed using an electronic medical records system. Although proofread, it may and can contain electronic and spelling errors. Other human spelling and other errors may be present. Corrections may be executed at a later time. Please feel free to contact us for any clarifications as needed.

## 2023-04-20 ENCOUNTER — PATIENT MESSAGE (OUTPATIENT)
Dept: INTERNAL MEDICINE CLINIC | Age: 58
End: 2023-04-20

## 2023-04-20 LAB
ALBUMIN SERPL-MCNC: 4.4 G/DL (ref 3.8–4.9)
ALBUMIN/GLOB SERPL: 1.8 {RATIO} (ref 1.2–2.2)
ALP SERPL-CCNC: 137 IU/L (ref 44–121)
ALT SERPL-CCNC: 20 IU/L (ref 0–32)
APPEARANCE UR: CLEAR
AST SERPL-CCNC: 19 IU/L (ref 0–40)
BASOPHILS # BLD AUTO: 0.1 X10E3/UL (ref 0–0.2)
BASOPHILS NFR BLD AUTO: 1 %
BILIRUB SERPL-MCNC: <0.2 MG/DL (ref 0–1.2)
BILIRUB UR QL STRIP: NEGATIVE
BUN SERPL-MCNC: 14 MG/DL (ref 8–27)
BUN/CREAT SERPL: 20 (ref 12–28)
CALCIUM SERPL-MCNC: 10 MG/DL (ref 8.7–10.3)
CHLORIDE SERPL-SCNC: 106 MMOL/L (ref 96–106)
CHOLEST SERPL-MCNC: 223 MG/DL (ref 100–199)
CO2 SERPL-SCNC: 25 MMOL/L (ref 20–29)
COLOR UR: YELLOW
CREAT SERPL-MCNC: 0.69 MG/DL (ref 0.57–1)
EGFRCR SERPLBLD CKD-EPI 2021: 99 ML/MIN/1.73
EOSINOPHIL # BLD AUTO: 0.1 X10E3/UL (ref 0–0.4)
EOSINOPHIL NFR BLD AUTO: 2 %
ERYTHROCYTE [DISTWIDTH] IN BLOOD BY AUTOMATED COUNT: 12.9 % (ref 11.7–15.4)
EST. AVERAGE GLUCOSE BLD GHB EST-MCNC: 117 MG/DL
GLOBULIN SER CALC-MCNC: 2.5 G/DL (ref 1.5–4.5)
GLUCOSE SERPL-MCNC: 91 MG/DL (ref 70–99)
GLUCOSE UR QL STRIP: NEGATIVE
HBA1C MFR BLD: 5.7 % (ref 4.8–5.6)
HCT VFR BLD AUTO: 37.8 % (ref 34–46.6)
HDLC SERPL-MCNC: 87 MG/DL
HGB BLD-MCNC: 12.4 G/DL (ref 11.1–15.9)
HGB UR QL STRIP: NEGATIVE
IMM GRANULOCYTES # BLD AUTO: 0 X10E3/UL (ref 0–0.1)
IMM GRANULOCYTES NFR BLD AUTO: 0 %
KETONES UR QL STRIP: NEGATIVE
LDLC SERPL CALC-MCNC: 120 MG/DL (ref 0–99)
LEUKOCYTE ESTERASE UR QL STRIP: NEGATIVE
LYMPHOCYTES # BLD AUTO: 2.4 X10E3/UL (ref 0.7–3.1)
LYMPHOCYTES NFR BLD AUTO: 35 %
MCH RBC QN AUTO: 30 PG (ref 26.6–33)
MCHC RBC AUTO-ENTMCNC: 32.8 G/DL (ref 31.5–35.7)
MCV RBC AUTO: 91 FL (ref 79–97)
MICRO URNS: NORMAL
MONOCYTES # BLD AUTO: 0.6 X10E3/UL (ref 0.1–0.9)
MONOCYTES NFR BLD AUTO: 8 %
NEUTROPHILS # BLD AUTO: 3.8 X10E3/UL (ref 1.4–7)
NEUTROPHILS NFR BLD AUTO: 54 %
NITRITE UR QL STRIP: NEGATIVE
PH UR STRIP: 7 [PH] (ref 5–7.5)
PLATELET # BLD AUTO: 210 X10E3/UL (ref 150–450)
POTASSIUM SERPL-SCNC: 4.5 MMOL/L (ref 3.5–5.2)
PROT SERPL-MCNC: 6.9 G/DL (ref 6–8.5)
PROT UR QL STRIP: NEGATIVE
RBC # BLD AUTO: 4.14 X10E6/UL (ref 3.77–5.28)
SODIUM SERPL-SCNC: 143 MMOL/L (ref 134–144)
SP GR UR STRIP: 1.02 (ref 1–1.03)
TRIGL SERPL-MCNC: 90 MG/DL (ref 0–149)
TSH SERPL DL<=0.005 MIU/L-ACNC: 1.45 UIU/ML (ref 0.45–4.5)
UROBILINOGEN UR STRIP-MCNC: 1 MG/DL (ref 0.2–1)
VLDLC SERPL CALC-MCNC: 16 MG/DL (ref 5–40)
WBC # BLD AUTO: 7 X10E3/UL (ref 3.4–10.8)

## 2023-04-23 DIAGNOSIS — I10 PRIMARY HYPERTENSION: Primary | ICD-10-CM

## 2023-05-16 RX ORDER — VALACYCLOVIR HYDROCHLORIDE 500 MG/1
500 TABLET, FILM COATED ORAL DAILY
COMMUNITY
Start: 2023-04-18

## 2023-05-16 RX ORDER — FLUTICASONE PROPIONATE 50 MCG
2 SPRAY, SUSPENSION (ML) NASAL DAILY
COMMUNITY
Start: 2023-04-18

## 2023-05-16 RX ORDER — LOSARTAN POTASSIUM 50 MG/1
50 TABLET ORAL DAILY
COMMUNITY
Start: 2023-04-18

## 2023-05-16 RX ORDER — ROSUVASTATIN CALCIUM 20 MG/1
1 TABLET, COATED ORAL NIGHTLY
COMMUNITY
Start: 2023-04-18

## 2023-05-16 RX ORDER — ESTRADIOL 0.1 MG/G
CREAM VAGINAL
COMMUNITY
Start: 2023-04-18

## 2023-05-17 ENCOUNTER — TRANSCRIBE ORDERS (OUTPATIENT)
Facility: HOSPITAL | Age: 58
End: 2023-05-17

## 2023-05-17 DIAGNOSIS — Z12.31 ENCOUNTER FOR SCREENING MAMMOGRAM FOR MALIGNANT NEOPLASM OF BREAST: Primary | ICD-10-CM

## 2023-08-24 ENCOUNTER — TELEPHONE (OUTPATIENT)
Facility: CLINIC | Age: 58
End: 2023-08-24

## 2024-05-17 ENCOUNTER — OFFICE VISIT (OUTPATIENT)
Facility: CLINIC | Age: 59
End: 2024-05-17
Payer: COMMERCIAL

## 2024-05-17 VITALS
HEART RATE: 71 BPM | OXYGEN SATURATION: 99 % | BODY MASS INDEX: 22.04 KG/M2 | RESPIRATION RATE: 18 BRPM | TEMPERATURE: 98.5 F | WEIGHT: 132.3 LBS | HEIGHT: 65 IN | SYSTOLIC BLOOD PRESSURE: 139 MMHG | DIASTOLIC BLOOD PRESSURE: 89 MMHG

## 2024-05-17 DIAGNOSIS — Z12.11 SCREEN FOR COLON CANCER: ICD-10-CM

## 2024-05-17 DIAGNOSIS — R73.02 IGT (IMPAIRED GLUCOSE TOLERANCE): ICD-10-CM

## 2024-05-17 DIAGNOSIS — H57.89 RED EYE: ICD-10-CM

## 2024-05-17 DIAGNOSIS — Q61.5 MEDULLARY SPONGE KIDNEY: ICD-10-CM

## 2024-05-17 DIAGNOSIS — I10 PRIMARY HYPERTENSION: Primary | ICD-10-CM

## 2024-05-17 DIAGNOSIS — M79.7 FIBROMYALGIA: ICD-10-CM

## 2024-05-17 DIAGNOSIS — E78.5 DYSLIPIDEMIA: ICD-10-CM

## 2024-05-17 LAB
ALBUMIN SERPL-MCNC: 3.7 G/DL (ref 3.5–5)
ALBUMIN/GLOB SERPL: 1.1 (ref 1.1–2.2)
ALP SERPL-CCNC: 148 U/L (ref 45–117)
ALT SERPL-CCNC: 20 U/L (ref 12–78)
ANION GAP SERPL CALC-SCNC: 5 MMOL/L (ref 5–15)
APPEARANCE UR: CLEAR
AST SERPL-CCNC: 17 U/L (ref 15–37)
BACTERIA URNS QL MICRO: ABNORMAL /HPF
BASOPHILS # BLD: 0.1 K/UL (ref 0–0.1)
BASOPHILS NFR BLD: 1 % (ref 0–1)
BILIRUB SERPL-MCNC: 0.4 MG/DL (ref 0.2–1)
BILIRUB UR QL: NEGATIVE
BUN SERPL-MCNC: 16 MG/DL (ref 6–20)
BUN/CREAT SERPL: 21 (ref 12–20)
CALCIUM SERPL-MCNC: 10.2 MG/DL (ref 8.5–10.1)
CHLORIDE SERPL-SCNC: 108 MMOL/L (ref 97–108)
CHOLEST SERPL-MCNC: 206 MG/DL
CO2 SERPL-SCNC: 29 MMOL/L (ref 21–32)
COLOR UR: ABNORMAL
CREAT SERPL-MCNC: 0.77 MG/DL (ref 0.55–1.02)
DIFFERENTIAL METHOD BLD: NORMAL
EOSINOPHIL # BLD: 0.2 K/UL (ref 0–0.4)
EOSINOPHIL NFR BLD: 3 % (ref 0–7)
EPITH CASTS URNS QL MICRO: ABNORMAL /LPF
ERYTHROCYTE [DISTWIDTH] IN BLOOD BY AUTOMATED COUNT: 13.2 % (ref 11.5–14.5)
EST. AVERAGE GLUCOSE BLD GHB EST-MCNC: 103 MG/DL
GLOBULIN SER CALC-MCNC: 3.3 G/DL (ref 2–4)
GLUCOSE SERPL-MCNC: 73 MG/DL (ref 65–100)
GLUCOSE UR STRIP.AUTO-MCNC: NEGATIVE MG/DL
HBA1C MFR BLD: 5.2 % (ref 4–5.6)
HCT VFR BLD AUTO: 37.8 % (ref 35–47)
HCV AB SER IA-ACNC: 0.04 INDEX
HCV AB SERPL QL IA: NONREACTIVE
HDLC SERPL-MCNC: 91 MG/DL
HDLC SERPL: 2.3 (ref 0–5)
HGB BLD-MCNC: 11.7 G/DL (ref 11.5–16)
HGB UR QL STRIP: NEGATIVE
HIV 1+2 AB+HIV1 P24 AG SERPL QL IA: NONREACTIVE
HIV 1/2 RESULT COMMENT: NORMAL
HYALINE CASTS URNS QL MICRO: ABNORMAL /LPF (ref 0–5)
IMM GRANULOCYTES # BLD AUTO: 0 K/UL (ref 0–0.04)
IMM GRANULOCYTES NFR BLD AUTO: 0 % (ref 0–0.5)
KETONES UR QL STRIP.AUTO: NEGATIVE MG/DL
LDLC SERPL CALC-MCNC: 102.4 MG/DL (ref 0–100)
LEUKOCYTE ESTERASE UR QL STRIP.AUTO: NEGATIVE
LYMPHOCYTES # BLD: 2.2 K/UL (ref 0.8–3.5)
LYMPHOCYTES NFR BLD: 41 % (ref 12–49)
MCH RBC QN AUTO: 29 PG (ref 26–34)
MCHC RBC AUTO-ENTMCNC: 31 G/DL (ref 30–36.5)
MCV RBC AUTO: 93.8 FL (ref 80–99)
MONOCYTES # BLD: 0.5 K/UL (ref 0–1)
MONOCYTES NFR BLD: 8 % (ref 5–13)
NEUTS SEG # BLD: 2.5 K/UL (ref 1.8–8)
NEUTS SEG NFR BLD: 47 % (ref 32–75)
NITRITE UR QL STRIP.AUTO: NEGATIVE
NRBC # BLD: 0 K/UL (ref 0–0.01)
NRBC BLD-RTO: 0 PER 100 WBC
PH UR STRIP: 6 (ref 5–8)
PLATELET # BLD AUTO: 169 K/UL (ref 150–400)
PMV BLD AUTO: 12.4 FL (ref 8.9–12.9)
POTASSIUM SERPL-SCNC: 4.3 MMOL/L (ref 3.5–5.1)
PROT SERPL-MCNC: 7 G/DL (ref 6.4–8.2)
PROT UR STRIP-MCNC: NEGATIVE MG/DL
RBC # BLD AUTO: 4.03 M/UL (ref 3.8–5.2)
RBC #/AREA URNS HPF: ABNORMAL /HPF (ref 0–5)
SODIUM SERPL-SCNC: 142 MMOL/L (ref 136–145)
SP GR UR REFRACTOMETRY: 1.02 (ref 1–1.03)
TRIGL SERPL-MCNC: 63 MG/DL
TSH SERPL DL<=0.05 MIU/L-ACNC: 1.32 UIU/ML (ref 0.36–3.74)
UROBILINOGEN UR QL STRIP.AUTO: 1 EU/DL (ref 0.2–1)
VLDLC SERPL CALC-MCNC: 12.6 MG/DL
WBC # BLD AUTO: 5.3 K/UL (ref 3.6–11)
WBC URNS QL MICRO: ABNORMAL /HPF (ref 0–4)

## 2024-05-17 PROCEDURE — 36415 COLL VENOUS BLD VENIPUNCTURE: CPT | Performed by: INTERNAL MEDICINE

## 2024-05-17 PROCEDURE — 99214 OFFICE O/P EST MOD 30 MIN: CPT | Performed by: INTERNAL MEDICINE

## 2024-05-17 PROCEDURE — 3079F DIAST BP 80-89 MM HG: CPT | Performed by: INTERNAL MEDICINE

## 2024-05-17 PROCEDURE — 3075F SYST BP GE 130 - 139MM HG: CPT | Performed by: INTERNAL MEDICINE

## 2024-05-17 RX ORDER — LOSARTAN POTASSIUM 100 MG/1
100 TABLET ORAL DAILY
Qty: 90 TABLET | Refills: 3 | Status: SHIPPED | OUTPATIENT
Start: 2024-05-17

## 2024-05-17 RX ORDER — ROSUVASTATIN CALCIUM 20 MG/1
20 TABLET, COATED ORAL NIGHTLY
Qty: 90 TABLET | Refills: 3 | Status: SHIPPED | OUTPATIENT
Start: 2024-05-17

## 2024-05-17 RX ORDER — VALACYCLOVIR HYDROCHLORIDE 500 MG/1
500 TABLET, FILM COATED ORAL DAILY
Qty: 90 TABLET | Refills: 3 | Status: SHIPPED | OUTPATIENT
Start: 2024-05-17

## 2024-05-17 RX ORDER — FLUTICASONE PROPIONATE 50 MCG
2 SPRAY, SUSPENSION (ML) NASAL DAILY
Qty: 16 G | Refills: 11 | Status: SHIPPED | OUTPATIENT
Start: 2024-05-17

## 2024-05-17 RX ORDER — CEFUROXIME AXETIL 500 MG/1
500 TABLET ORAL 2 TIMES DAILY
Qty: 20 TABLET | Refills: 0 | Status: SHIPPED | OUTPATIENT
Start: 2024-05-17 | End: 2024-05-27

## 2024-05-17 RX ORDER — ESTRADIOL 0.1 MG/G
CREAM VAGINAL
Status: CANCELLED | OUTPATIENT
Start: 2024-05-17

## 2024-05-17 SDOH — ECONOMIC STABILITY: HOUSING INSECURITY
IN THE LAST 12 MONTHS, WAS THERE A TIME WHEN YOU DID NOT HAVE A STEADY PLACE TO SLEEP OR SLEPT IN A SHELTER (INCLUDING NOW)?: NO

## 2024-05-17 SDOH — ECONOMIC STABILITY: FOOD INSECURITY: WITHIN THE PAST 12 MONTHS, YOU WORRIED THAT YOUR FOOD WOULD RUN OUT BEFORE YOU GOT MONEY TO BUY MORE.: SOMETIMES TRUE

## 2024-05-17 SDOH — ECONOMIC STABILITY: INCOME INSECURITY: HOW HARD IS IT FOR YOU TO PAY FOR THE VERY BASICS LIKE FOOD, HOUSING, MEDICAL CARE, AND HEATING?: VERY HARD

## 2024-05-17 SDOH — ECONOMIC STABILITY: FOOD INSECURITY: WITHIN THE PAST 12 MONTHS, THE FOOD YOU BOUGHT JUST DIDN'T LAST AND YOU DIDN'T HAVE MONEY TO GET MORE.: SOMETIMES TRUE

## 2024-05-17 ASSESSMENT — PATIENT HEALTH QUESTIONNAIRE - PHQ9
SUM OF ALL RESPONSES TO PHQ QUESTIONS 1-9: 2
SUM OF ALL RESPONSES TO PHQ9 QUESTIONS 1 & 2: 2
2. FEELING DOWN, DEPRESSED OR HOPELESS: SEVERAL DAYS
SUM OF ALL RESPONSES TO PHQ QUESTIONS 1-9: 2
1. LITTLE INTEREST OR PLEASURE IN DOING THINGS: SEVERAL DAYS

## 2024-05-17 NOTE — PATIENT INSTRUCTIONS
pm; Sat 9 am - 12 pm)    Feed [facility] Emergency Food Hotline  What they offer: Assistance with finding a food pantry, soup kitchen, or resource near you.  Website:  https://Cell[employer].org/agency-network/agency-/ (search zip code)  Hunger Hotline Inquiry Form: https://Cell[employer].org/hunger-hotline-inquiry-form/  Hunger Hotline Phone Number:  804-521-2500 x [phone] (M-F 8:00 am-4:00 pm)    Meals on Wheels  Meals on Wheels is a program that delivers meals to individuals who have no reliable means for maintaining a healthy diet.  Services are provided by area.     [facility] Service Area:   [facility], [place], and [place].  [facility], [place], [place], [place], [place], [place], [place], [place], [place], and [place]  Website:  https://Cellvine.org/how-we-help/meals-on-wheels/  To Apply:  Ages 18-59:  Apply online: https://[employer]/meals-on-wheels-application-form/  Apply by phone: [phone]        Meals on Wheels  [facility] Service Area (continued):  To Apply:  Ages 60+:  Apply Online: https://Cellvine.org/meals-on-wheels-application-form/  Apply By Phone: [phone] (M-F 830 am- 5 pm)  For [facility], [facility], [place], [place], [place], [place], [place] and [place]: You may also apply by calling [facility] [place], [facility] Agency on Aging at [phone].  For [facility], [place], and [place] as well as [facility], [facility], [place], [place], and [place]:    Contact [facility] Agency on [phone]    [facility] Service Area:   [facility], [place], [facility], [place], [place], [place], [place], [place], [place] and [place].  Website: [email or web address]  To Apply by phone: [phone]    [place] [employer] ([facility]) area:   [facility]

## 2024-05-17 NOTE — PROGRESS NOTES
SPORTS MEDICINE AND PRIMARY CARE  Josh Fernandez MD, FACP, CMD  [address] [facility] [address]  Phone:  [phone]  Fax: [phone]       Chief Complaint   Patient presents with    Other     Patient states that she hasn't been here in a while and needs her prescription filled.   .      SUBJECTIVE:    Hilda Mojica is a 59 y.o. female Patient returns today with a known history of impaired glucose tolerance, nephrolithiasis, medullary sponge kidney, lumbar radiculopathy, dyslipidemia, primary hypertension, fibromyalgia, dense breasts, atypical squamous cells on Pap smear and is seen for evaluation.      For the past couple weeks she has noted redness of the left eye and also tenderness of the left nostril.  There is no drainage, however.      She notes arthritic pains in her hands.  She thinks it is related to old age.  Patient is seen for evaluation.             Current Outpatient Medications   Medication Sig Dispense Refill    fluticasone (FLONASE) 50 MCG/ACT nasal spray 2 sprays by Nasal route daily 16 g 11    losartan (COZAAR) 100 MG tablet Take 1 tablet by mouth daily 90 tablet 3    rosuvastatin (CRESTOR) 20 MG tablet Take 1 tablet by mouth nightly 90 tablet 3    valACYclovir (VALTREX) 500 MG tablet Take 1 tablet by mouth daily 90 tablet 3    cefUROXime (CEFTIN) 500 MG tablet Take 1 tablet by mouth 2 times daily for 10 days 20 tablet 0    estradiol (ESTRACE) 0.1 MG/GM vaginal cream Use 0.5 grams per applicator in vagina twice a week       No current facility-administered medications for this visit.     Past Medical History:   Diagnosis Date    Atypical squamous cells of undetermined significance (ASCUS) on Papanicolaou smear of cervix 11/27/2020    BPV (benign positional vertigo)     Breast pain     Cigarette smoker     stopped 1-1-14    COVID-19 virus infection 01/12/2022    off work 1/11/22 - 1/27/22    Dense breast     Dyslipidemia     Elevated serum hCG in female, not pregnant 02/18/2021

## 2024-05-17 NOTE — PROGRESS NOTES
Chief Complaint   Patient presents with    Other     Patient states that she hasn't been here in a while and needs her prescription filled.     \"Have you been to the ER, urgent care clinic since your last visit?  Hospitalized since your last visit?\"    NO    “Have you seen or consulted any other health care providers outside of [facility] since your last visit?”    NO    Have you had a mammogram?”   YES - Where: [facility] to request most recent records if not in the chart    Date of last Mammogram: 3/4/2021         “Have you had a colorectal cancer screening such as a colonoscopy/FIT/Cologuard?    NO    No colonoscopy on file  No cologuard on file  No FIT/FOBT on file   No flexible sigmoidoscopy on file         Click Here for Release of Records Request

## 2024-05-20 DIAGNOSIS — R74.8 ELEVATED ALKALINE PHOSPHATASE LEVEL: Primary | ICD-10-CM

## 2024-05-23 DIAGNOSIS — Q61.5 MEDULLARY SPONGE KIDNEY: Primary | ICD-10-CM

## 2024-05-23 DIAGNOSIS — R74.8 ELEVATED ALKALINE PHOSPHATASE LEVEL: ICD-10-CM

## 2024-05-23 PROCEDURE — 36415 COLL VENOUS BLD VENIPUNCTURE: CPT | Performed by: INTERNAL MEDICINE

## 2024-05-25 LAB
ALP BONE CFR SERPL: 49 % (ref 14–68)
ALP INTEST CFR SERPL: 2 % (ref 0–18)
ALP LIVER CFR SERPL: 49 % (ref 18–85)
ALP SERPL-CCNC: 146 IU/L (ref 44–121)

## 2024-06-06 ENCOUNTER — TELEPHONE (OUTPATIENT)
Facility: CLINIC | Age: 59
End: 2024-06-06

## 2024-06-06 NOTE — TELEPHONE ENCOUNTER
LVM to inform patient that she need to repeat blood work per Dr. Fernandez (alk phos isoenzymes, grammopathy)

## 2024-06-06 NOTE — TELEPHONE ENCOUNTER
----- Message from Josh Fernandez MD sent at 5/23/2024  8:06 PM EDT -----  Advised patient to return the office to repeat BMP, alk phos isoenzymes gammopathy pofile

## 2024-06-10 ENCOUNTER — NURSE ONLY (OUTPATIENT)
Facility: CLINIC | Age: 59
End: 2024-06-10

## 2024-06-10 DIAGNOSIS — Q61.5 MEDULLARY SPONGE KIDNEY: ICD-10-CM

## 2024-06-10 DIAGNOSIS — R74.8 ELEVATED ALKALINE PHOSPHATASE LEVEL: Primary | ICD-10-CM

## 2024-06-10 DIAGNOSIS — R74.8 ELEVATED ALKALINE PHOSPHATASE LEVEL: ICD-10-CM

## 2024-06-11 LAB
ANION GAP SERPL CALC-SCNC: 3 MMOL/L (ref 5–15)
BUN SERPL-MCNC: 10 MG/DL (ref 6–20)
BUN/CREAT SERPL: 15 (ref 12–20)
CALCIUM SERPL-MCNC: 9.4 MG/DL (ref 8.5–10.1)
CHLORIDE SERPL-SCNC: 108 MMOL/L (ref 97–108)
CO2 SERPL-SCNC: 28 MMOL/L (ref 21–32)
CREAT SERPL-MCNC: 0.65 MG/DL (ref 0.55–1.02)
GLUCOSE SERPL-MCNC: 116 MG/DL (ref 65–100)
POTASSIUM SERPL-SCNC: 4.3 MMOL/L (ref 3.5–5.1)
SODIUM SERPL-SCNC: 139 MMOL/L (ref 136–145)

## 2024-06-14 ENCOUNTER — TELEPHONE (OUTPATIENT)
Facility: CLINIC | Age: 59
End: 2024-06-14

## 2024-06-14 NOTE — TELEPHONE ENCOUNTER
Left message for patient to schedule mammogram ordered by PCP  Requested patient to return call to panel manager at [phone] to schedule mammogram or notify that mammogram   has been completed at an outside facility.

## 2024-06-16 LAB
ALP BONE CFR SERPL: 53 % (ref 14–68)
ALP INTEST CFR SERPL: 2 % (ref 0–18)
ALP LIVER CFR SERPL: 45 % (ref 18–85)
ALP SERPL-CCNC: 144 IU/L (ref 44–121)

## 2024-06-17 LAB
ALBUMIN SERPL ELPH-MCNC: 3.6 G/DL (ref 2.9–4.4)
ALBUMIN/GLOB SERPL: 1.3 (ref 0.7–1.7)
ALPHA1 GLOB SERPL ELPH-MCNC: 0.2 G/DL (ref 0–0.4)
ALPHA2 GLOB SERPL ELPH-MCNC: 0.7 G/DL (ref 0.4–1)
B-GLOBULIN SERPL ELPH-MCNC: 0.9 G/DL (ref 0.7–1.3)
GAMMA GLOB SERPL ELPH-MCNC: 1 G/DL (ref 0.4–1.8)
GLOBULIN SER-MCNC: 2.9 G/DL (ref 2.2–3.9)
IGA SERPL-MCNC: 140 MG/DL (ref 87–352)
IGG SERPL-MCNC: 1071 MG/DL (ref 586–1602)
IGM SERPL-MCNC: 68 MG/DL (ref 26–217)
INTERPRETATION SERPL IEP-IMP: NORMAL
KAPPA LC FREE SER-MCNC: 13.5 MG/L (ref 3.3–19.4)
KAPPA LC FREE/LAMBDA FREE SER: 1.25 (ref 0.26–1.65)
LAMBDA LC FREE SERPL-MCNC: 10.8 MG/L (ref 5.7–26.3)
M PROTEIN SERPL ELPH-MCNC: NORMAL G/DL
PROT SERPL-MCNC: 6.5 G/DL (ref 6–8.5)

## 2024-12-21 RX ORDER — AMLODIPINE BESYLATE 5 MG/1
5 TABLET ORAL DAILY
Qty: 90 TABLET | Refills: 0 | Status: SHIPPED | OUTPATIENT
Start: 2024-12-21

## 2025-03-31 RX ORDER — AMLODIPINE BESYLATE 5 MG/1
5 TABLET ORAL DAILY
Qty: 90 TABLET | Refills: 0 | Status: SHIPPED | OUTPATIENT
Start: 2025-03-31

## 2025-07-23 ENCOUNTER — OFFICE VISIT (OUTPATIENT)
Facility: CLINIC | Age: 60
End: 2025-07-23
Payer: COMMERCIAL

## 2025-07-23 VITALS
BODY MASS INDEX: 21.96 KG/M2 | WEIGHT: 131.8 LBS | RESPIRATION RATE: 18 BRPM | TEMPERATURE: 98.3 F | DIASTOLIC BLOOD PRESSURE: 85 MMHG | HEIGHT: 65 IN | SYSTOLIC BLOOD PRESSURE: 137 MMHG | HEART RATE: 58 BPM | OXYGEN SATURATION: 97 %

## 2025-07-23 DIAGNOSIS — E78.5 DYSLIPIDEMIA: ICD-10-CM

## 2025-07-23 DIAGNOSIS — R73.02 IGT (IMPAIRED GLUCOSE TOLERANCE): ICD-10-CM

## 2025-07-23 DIAGNOSIS — Z12.11 SCREEN FOR COLON CANCER: ICD-10-CM

## 2025-07-23 DIAGNOSIS — I10 PRIMARY HYPERTENSION: Primary | ICD-10-CM

## 2025-07-23 DIAGNOSIS — R51.9 CHRONIC NONINTRACTABLE HEADACHE, UNSPECIFIED HEADACHE TYPE: ICD-10-CM

## 2025-07-23 DIAGNOSIS — G89.29 CHRONIC NONINTRACTABLE HEADACHE, UNSPECIFIED HEADACHE TYPE: ICD-10-CM

## 2025-07-23 DIAGNOSIS — R06.02 SHORTNESS OF BREATH: ICD-10-CM

## 2025-07-23 PROCEDURE — 99214 OFFICE O/P EST MOD 30 MIN: CPT | Performed by: INTERNAL MEDICINE

## 2025-07-23 PROCEDURE — 3079F DIAST BP 80-89 MM HG: CPT | Performed by: INTERNAL MEDICINE

## 2025-07-23 PROCEDURE — 3075F SYST BP GE 130 - 139MM HG: CPT | Performed by: INTERNAL MEDICINE

## 2025-07-23 RX ORDER — ROSUVASTATIN CALCIUM 20 MG/1
20 TABLET, COATED ORAL NIGHTLY
Qty: 90 TABLET | Refills: 3 | Status: SHIPPED | OUTPATIENT
Start: 2025-07-23 | End: 2025-07-24 | Stop reason: DRUGHIGH

## 2025-07-23 RX ORDER — LOSARTAN POTASSIUM 100 MG/1
100 TABLET ORAL DAILY
Qty: 90 TABLET | Refills: 3 | Status: SHIPPED | OUTPATIENT
Start: 2025-07-23

## 2025-07-23 RX ORDER — AMLODIPINE BESYLATE 5 MG/1
5 TABLET ORAL DAILY
Qty: 90 TABLET | Refills: 3 | Status: SHIPPED | OUTPATIENT
Start: 2025-07-23

## 2025-07-23 RX ORDER — FLUTICASONE PROPIONATE 50 MCG
2 SPRAY, SUSPENSION (ML) NASAL DAILY
Qty: 16 G | Refills: 11 | Status: SHIPPED | OUTPATIENT
Start: 2025-07-23

## 2025-07-23 RX ORDER — VALACYCLOVIR HYDROCHLORIDE 500 MG/1
500 TABLET, FILM COATED ORAL DAILY
Qty: 90 TABLET | Refills: 3 | Status: SHIPPED | OUTPATIENT
Start: 2025-07-23

## 2025-07-23 SDOH — ECONOMIC STABILITY: FOOD INSECURITY: WITHIN THE PAST 12 MONTHS, YOU WORRIED THAT YOUR FOOD WOULD RUN OUT BEFORE YOU GOT MONEY TO BUY MORE.: NEVER TRUE

## 2025-07-23 SDOH — ECONOMIC STABILITY: FOOD INSECURITY: WITHIN THE PAST 12 MONTHS, THE FOOD YOU BOUGHT JUST DIDN'T LAST AND YOU DIDN'T HAVE MONEY TO GET MORE.: NEVER TRUE

## 2025-07-23 ASSESSMENT — PATIENT HEALTH QUESTIONNAIRE - PHQ9
SUM OF ALL RESPONSES TO PHQ QUESTIONS 1-9: 2
SUM OF ALL RESPONSES TO PHQ QUESTIONS 1-9: 2
1. LITTLE INTEREST OR PLEASURE IN DOING THINGS: SEVERAL DAYS
SUM OF ALL RESPONSES TO PHQ QUESTIONS 1-9: 2
2. FEELING DOWN, DEPRESSED OR HOPELESS: SEVERAL DAYS
SUM OF ALL RESPONSES TO PHQ QUESTIONS 1-9: 2

## 2025-07-23 NOTE — PROGRESS NOTES
Chief Complaint   Patient presents with    Medication Refill    Follow-up    Hypertension     Have you been to the ER, urgent care clinic since your last visit?  Hospitalized since your last visit?   NO    Have you seen or consulted any other health care providers outside our system since your last visit?   NO    Have you had a mammogram?   NO    Date of last Mammogram: 3/4/2021       Have you had a colorectal cancer screening such as a colonoscopy/FIT/Cologuard?    NO    No colonoscopy on file  No cologuard on file  No FIT/FOBT on file   No flexible sigmoidoscopy on file        '

## 2025-07-23 NOTE — PROGRESS NOTES
SPORTS MEDICINE AND PRIMARY CARE  Josh Fernandez MD, FACP, CMD  [address] [address]  Phone:  [phone]  Fax: [phone]       Chief Complaint   Patient presents with    Medication Refill    Follow-up    Hypertension   .      SUBJECTIVE:       History of Present Illness  The patient is a 60-year-old black female who returns today with a history of primary hypertension, dyslipidemia, impaired glucose tolerance, and headaches, presenting for a follow-up visit.    She reports experiencing shortness of breath during physical activity but does not experience any associated chest pain. She also mentions an increased heart rate during these episodes.    She has been feeling slightly depressed due to the recent loss of her mother in 2024 from kidney failure. However, she does not wish to seek counseling at this time as she is currently working extended hours with a schedule change to 12-hour shifts.    She is seeking refills for her medications and is requesting a prescription for a sleep aid as she had difficulty sleeping the previous night.    She has not yet undergone a colonoscopy and is unsure about the status of her mammogram. She has a gynecologist whom she consults for her vaginal cream.    Occupations: Works at [employer] for 9 years  Sleep: Reports difficulty sleeping    FAMILY HISTORY  Her mother had lung cancer and  from kidney failure at the age of 82.    Current Outpatient Medications   Medication Sig Dispense Refill    amLODIPine (NORVASC) 5 MG tablet Take 1 tablet by mouth daily 90 tablet 3    fluticasone (FLONASE) 50 MCG/ACT nasal spray 2 sprays by Nasal route daily 16 g 11    losartan (COZAAR) 100 MG tablet Take 1 tablet by mouth daily 90 tablet 3    rosuvastatin (CRESTOR) 20 MG tablet Take 1 tablet by mouth nightly 90 tablet 3    valACYclovir (VALTREX) 500 MG tablet Take 1 tablet by mouth daily 90 tablet 3    estradiol (ESTRACE) 0.1 MG/GM vaginal cream Use 0.5

## 2025-07-24 LAB
ALBUMIN SERPL-MCNC: 3.7 G/DL (ref 3.5–5)
ALBUMIN/GLOB SERPL: 1.1 (ref 1.1–2.2)
ALP SERPL-CCNC: 144 U/L (ref 45–117)
ALT SERPL-CCNC: 22 U/L (ref 12–78)
ANION GAP SERPL CALC-SCNC: 4 MMOL/L (ref 2–12)
APPEARANCE UR: CLEAR
AST SERPL-CCNC: 23 U/L (ref 15–37)
BACTERIA URNS QL MICRO: ABNORMAL /HPF
BASOPHILS # BLD: 0.07 K/UL (ref 0–0.1)
BASOPHILS NFR BLD: 1.2 % (ref 0–1)
BILIRUB SERPL-MCNC: 0.4 MG/DL (ref 0.2–1)
BILIRUB UR QL: NEGATIVE
BUN SERPL-MCNC: 17 MG/DL (ref 6–20)
BUN/CREAT SERPL: 24 (ref 12–20)
CALCIUM SERPL-MCNC: 9.3 MG/DL (ref 8.5–10.1)
CHLORIDE SERPL-SCNC: 109 MMOL/L (ref 97–108)
CHOLEST SERPL-MCNC: 209 MG/DL
CO2 SERPL-SCNC: 26 MMOL/L (ref 21–32)
COLOR UR: ABNORMAL
CREAT SERPL-MCNC: 0.71 MG/DL (ref 0.55–1.02)
DIFFERENTIAL METHOD BLD: ABNORMAL
EOSINOPHIL # BLD: 0.2 K/UL (ref 0–0.4)
EOSINOPHIL NFR BLD: 3.4 % (ref 0–7)
EPITH CASTS URNS QL MICRO: ABNORMAL /LPF
ERYTHROCYTE [DISTWIDTH] IN BLOOD BY AUTOMATED COUNT: 13.2 % (ref 11.5–14.5)
EST. AVERAGE GLUCOSE BLD GHB EST-MCNC: 103 MG/DL
GLOBULIN SER CALC-MCNC: 3.3 G/DL (ref 2–4)
GLUCOSE SERPL-MCNC: 89 MG/DL (ref 65–100)
GLUCOSE UR STRIP.AUTO-MCNC: NEGATIVE MG/DL
HBA1C MFR BLD: 5.2 % (ref 4–5.6)
HCT VFR BLD AUTO: 37.5 % (ref 35–47)
HDLC SERPL-MCNC: 86 MG/DL
HDLC SERPL: 2.4 (ref 0–5)
HGB BLD-MCNC: 11.5 G/DL (ref 11.5–16)
HGB UR QL STRIP: NEGATIVE
HYALINE CASTS URNS QL MICRO: ABNORMAL /LPF (ref 0–5)
IMM GRANULOCYTES # BLD AUTO: 0.01 K/UL (ref 0–0.04)
IMM GRANULOCYTES NFR BLD AUTO: 0.2 % (ref 0–0.5)
KETONES UR QL STRIP.AUTO: NEGATIVE MG/DL
LDLC SERPL CALC-MCNC: 106 MG/DL (ref 0–100)
LEUKOCYTE ESTERASE UR QL STRIP.AUTO: NEGATIVE
LYMPHOCYTES # BLD: 2.43 K/UL (ref 0.8–3.5)
LYMPHOCYTES NFR BLD: 41.5 % (ref 12–49)
MCH RBC QN AUTO: 29 PG (ref 26–34)
MCHC RBC AUTO-ENTMCNC: 30.7 G/DL (ref 30–36.5)
MCV RBC AUTO: 94.7 FL (ref 80–99)
MONOCYTES # BLD: 0.6 K/UL (ref 0–1)
MONOCYTES NFR BLD: 10.2 % (ref 5–13)
NEUTS SEG # BLD: 2.55 K/UL (ref 1.8–8)
NEUTS SEG NFR BLD: 43.5 % (ref 32–75)
NITRITE UR QL STRIP.AUTO: NEGATIVE
NRBC # BLD: 0 K/UL (ref 0–0.01)
NRBC BLD-RTO: 0 PER 100 WBC
PH UR STRIP: 6 (ref 5–8)
PLATELET # BLD AUTO: 210 K/UL (ref 150–400)
PMV BLD AUTO: 11 FL (ref 8.9–12.9)
POTASSIUM SERPL-SCNC: 3.8 MMOL/L (ref 3.5–5.1)
PROT SERPL-MCNC: 7 G/DL (ref 6.4–8.2)
PROT UR STRIP-MCNC: ABNORMAL MG/DL
RBC # BLD AUTO: 3.96 M/UL (ref 3.8–5.2)
RBC #/AREA URNS HPF: ABNORMAL /HPF (ref 0–5)
SODIUM SERPL-SCNC: 139 MMOL/L (ref 136–145)
SP GR UR REFRACTOMETRY: 1.02 (ref 1–1.03)
TRIGL SERPL-MCNC: 85 MG/DL
TSH SERPL DL<=0.05 MIU/L-ACNC: 1.03 UIU/ML (ref 0.36–3.74)
UROBILINOGEN UR QL STRIP.AUTO: 1 EU/DL (ref 0.2–1)
VLDLC SERPL CALC-MCNC: 17 MG/DL
WBC # BLD AUTO: 5.9 K/UL (ref 3.6–11)
WBC URNS QL MICRO: ABNORMAL /HPF (ref 0–4)

## 2025-08-11 ENCOUNTER — COMMUNITY OUTREACH (OUTPATIENT)
Facility: CLINIC | Age: 60
End: 2025-08-11